# Patient Record
Sex: FEMALE | Race: WHITE | Employment: PART TIME | URBAN - NONMETROPOLITAN AREA
[De-identification: names, ages, dates, MRNs, and addresses within clinical notes are randomized per-mention and may not be internally consistent; named-entity substitution may affect disease eponyms.]

---

## 2021-06-17 ENCOUNTER — NURSE ONLY (OUTPATIENT)
Dept: LAB | Age: 26
End: 2021-06-17

## 2021-06-17 LAB
ABO: NORMAL
ALT SERPL-CCNC: 30 U/L (ref 11–66)
ANTIBODY SCREEN: NORMAL
AST SERPL-CCNC: 34 U/L (ref 5–40)
BASOPHILS # BLD: 0.2 %
BASOPHILS ABSOLUTE: 0 THOU/MM3 (ref 0–0.1)
BUN BLDV-MCNC: 7 MG/DL (ref 7–22)
CREAT SERPL-MCNC: 0.4 MG/DL (ref 0.4–1.2)
CREATININE URINE: 181.9 MG/DL
EOSINOPHIL # BLD: 0.6 %
EOSINOPHILS ABSOLUTE: 0.1 THOU/MM3 (ref 0–0.4)
ERYTHROCYTE [DISTWIDTH] IN BLOOD BY AUTOMATED COUNT: 13.2 % (ref 11.5–14.5)
ERYTHROCYTE [DISTWIDTH] IN BLOOD BY AUTOMATED COUNT: 39.8 FL (ref 35–45)
GFR SERPL CREATININE-BSD FRML MDRD: > 90 ML/MIN/1.73M2
HCT VFR BLD CALC: 43.5 % (ref 37–47)
HEMOGLOBIN: 13.4 GM/DL (ref 12–16)
HEPATITIS B SURFACE ANTIGEN: NEGATIVE
IMMATURE GRANS (ABS): 0.03 THOU/MM3 (ref 0–0.07)
IMMATURE GRANULOCYTES: 0.3 %
LYMPHOCYTES # BLD: 25 %
LYMPHOCYTES ABSOLUTE: 2.6 THOU/MM3 (ref 1–4.8)
MCH RBC QN AUTO: 25.8 PG (ref 26–33)
MCHC RBC AUTO-ENTMCNC: 30.8 GM/DL (ref 32.2–35.5)
MCV RBC AUTO: 83.8 FL (ref 81–99)
MONOCYTES # BLD: 4.5 %
MONOCYTES ABSOLUTE: 0.5 THOU/MM3 (ref 0.4–1.3)
NUCLEATED RED BLOOD CELLS: 0 /100 WBC
PLATELET # BLD: 247 THOU/MM3 (ref 130–400)
PMV BLD AUTO: 9.8 FL (ref 9.4–12.4)
PROT/CREAT RATIO, UR: 0.12
PROTEIN, URINE: 22.7 MG/DL
RBC # BLD: 5.19 MILL/MM3 (ref 4.2–5.4)
RH FACTOR: NORMAL
SEG NEUTROPHILS: 69.4 %
SEGMENTED NEUTROPHILS ABSOLUTE COUNT: 7.1 THOU/MM3 (ref 1.8–7.7)
URIC ACID: 3.5 MG/DL (ref 2.4–5.7)
WBC # BLD: 10.2 THOU/MM3 (ref 4.8–10.8)

## 2021-06-18 LAB
HIV AG/AB: NONREACTIVE
RPR: NONREACTIVE
RUBELLA: 26 IU/ML

## 2021-06-19 LAB
ORGANISM: ABNORMAL
URINE CULTURE, ROUTINE: ABNORMAL

## 2021-06-28 ENCOUNTER — NURSE ONLY (OUTPATIENT)
Dept: LAB | Age: 26
End: 2021-06-28

## 2021-06-28 LAB — HEPATITIS C ANTIBODY: NEGATIVE

## 2021-07-01 ENCOUNTER — HOSPITAL ENCOUNTER (OUTPATIENT)
Dept: ULTRASOUND IMAGING | Age: 26
Discharge: HOME | End: 2021-07-01
Payer: COMMERCIAL

## 2021-07-01 ENCOUNTER — HOSPITAL ENCOUNTER (OUTPATIENT)
Age: 26
Discharge: HOME | End: 2021-07-01
Payer: COMMERCIAL

## 2021-07-01 DIAGNOSIS — M32.9 SYSTEMIC LUPUS ERYTHEMATOSUS, UNSPECIFIED SLE TYPE, UNSPECIFIED ORGAN INVOLVEMENT STATUS (HCC): Primary | ICD-10-CM

## 2021-07-01 LAB
C3 COMPLEMENT: 166 MG/DL (ref 90–180)
COMPLEMENT C4: 28 MG/DL (ref 10–40)

## 2021-07-01 PROCEDURE — 86235 NUCLEAR ANTIGEN ANTIBODY: CPT

## 2021-07-01 PROCEDURE — 36415 COLL VENOUS BLD VENIPUNCTURE: CPT

## 2021-07-01 PROCEDURE — 85610 PROTHROMBIN TIME: CPT

## 2021-07-01 PROCEDURE — 86162 COMPLEMENT TOTAL (CH50): CPT

## 2021-07-01 PROCEDURE — 86225 DNA ANTIBODY NATIVE: CPT

## 2021-07-01 PROCEDURE — 85730 THROMBOPLASTIN TIME PARTIAL: CPT

## 2021-07-01 PROCEDURE — 99211 OFF/OP EST MAY X REQ PHY/QHP: CPT

## 2021-07-01 PROCEDURE — 99243 OFF/OP CNSLTJ NEW/EST LOW 30: CPT | Performed by: OBSTETRICS & GYNECOLOGY

## 2021-07-01 PROCEDURE — 85613 RUSSELL VIPER VENOM DILUTED: CPT

## 2021-07-01 PROCEDURE — 86038 ANTINUCLEAR ANTIBODIES: CPT

## 2021-07-01 PROCEDURE — 86160 COMPLEMENT ANTIGEN: CPT

## 2021-07-01 NOTE — LETTER
2021    Kinjal Cantu MD  [facility] Digna Chappell    RE:  Danielle Asencio  :  [birth year]    Dear Dr. Barbara Germain,    At your request, your patient, Danielle Asencio was seen today in consult given a prior diagnosis of systemic lupus erythematosis (SLE). I reviewed her medical history, discussed SLE and pregnancy, and we made an initial plan of care. The majority of time, greater than 50%, was spent in counseling and care coordination. The approximate time spent face to face was 20 minutes. She is a 22 yo  at [address] gestation (EDC:  22). She has had a reassuring first trimester screen. She was initially diagnosed with celiac disease and malabsorption disorder in [id number]. The patient disagrees with this diagnosis and thinks it was an early manifestation of SLE which she was diagnosed with about a year later in . She had initial symptoms of joint pain and fatigue. She was diagnosed by Dr. Myrna Sheth in [facility]. She took plaqenil for a year and then self-discontinued due to lack of symptoms. On closer questioning, every 1-2 months, she has episodes of increased hip stiffness, wrist pain, fatigue and a malar rash. She has no other medical problems, history of surgeries or allergies. She works as a nurses' aide. She had COVID in early  but feels well at this time. The baby's father, Roselyn Day, is also 22years of age. This will be his first child. His mother had surgery for VSD as a child but no other information is available for her family as she was adopted. The patient and I discussed her diagnostic parameters and she knows little about this. She wonders about the diagnosis and would like to be re-tested. She cannot get into a local rheumatologist until 2022. Her prior physician is currently in [place], per the patient. We made the following plan:    1. Level II survey in 4w  2.   Low dose aspirin for pre-eclampsia prevention  3. Appointment with [facility] rheumatology  4. The following labs were drawn today:  PAUL, LAC, Complements, SSA/SSB, anti-dsDNA, anti Red. Further plans pending those results. Thank you for allowing us to see this patient. Gideon Farrell MD, MPH

## 2021-07-01 NOTE — PLAN OF CARE
Provider discussed disease process, treatment plan, medications,and discharge instructions. Patient and significant other agrees with plan. Any questions were answered.

## 2021-07-01 NOTE — PROGRESS NOTES
HT:  5' 7\"  WT:  183. 6 Lbs.  83.5  Kg.  T:    97.9  Skin  BP: 116/68  P: 106  R: 20  Problems/concerns- \"Can't get into RA doctor until end of Jan\". No CoVid signs/symptoms.

## 2021-07-03 LAB
ANA SCREEN: NORMAL
COMPLEMENT TOTAL (CH50): 74 U/ML (ref 38.7–89.9)

## 2021-07-04 LAB — DSDNA ANTIBODY: NORMAL

## 2021-07-05 LAB
ANTI SSA: NORMAL
ANTI SSB: 0 AU/ML (ref 0–40)
ANTI-SMITH: 1 AU/ML (ref 0–40)
DRVVT 1:1 MIX: ABNORMAL SEC (ref 33–44)
DRVVT CONFIRMATION TEST: ABNORMAL RATIO
DRVVT SCREEN: 24 SEC (ref 33–44)
HEXAGONAL PHOSPHOLIPID NEUTRALIZAT TEST: ABNORMAL
LUPUS ANTICOAG INTERP: ABNORMAL
PLATELET NEUTRALIZATION: ABNORMAL
PROTHROMBIN TIME: 12.4 SEC (ref 12–15.5)
PTT 1:1 MIX: ABNORMAL SEC (ref 32–48)
PTT LUPUS ANTICOAGULANT: 34 SEC (ref 32–48)
PTT-HEPARIN NEUTRALIZED: ABNORMAL SEC (ref 32–48)
REPTILASE TIME: ABNORMAL SEC
THROMBIN TIME: ABNORMAL SEC (ref 14.7–19.5)

## 2021-08-05 ENCOUNTER — HOSPITAL ENCOUNTER (OUTPATIENT)
Dept: ULTRASOUND IMAGING | Age: 26
Discharge: HOME | End: 2021-08-05
Payer: COMMERCIAL

## 2021-08-05 DIAGNOSIS — M32.9 LUPUS (HCC): ICD-10-CM

## 2021-08-05 PROCEDURE — 76811 OB US DETAILED SNGL FETUS: CPT

## 2021-08-05 PROCEDURE — 99211 OFF/OP EST MAY X REQ PHY/QHP: CPT

## 2021-08-05 NOTE — PROGRESS NOTES
HT: 5' 7\"    WT:   187.2 Lbs.  85.1 Kg.  T:    98.1  Skin  BP: 117/58  P: 84  R: 20  Problems/concerns- \"Really tired at night feel like I am going to pass out. \"  I asked if anyone suggested put feet up at night and she stated \"no-but I have been reading that\"  I also suggested drinking plenty of fluids and she stated \"she has-drinking all the time\". No CoVid signs/symptoms.

## 2022-01-04 ENCOUNTER — HOSPITAL ENCOUNTER (INPATIENT)
Age: 27
LOS: 2 days | Discharge: HOME | End: 2022-01-07
Attending: OBSTETRICS & GYNECOLOGY | Admitting: OBSTETRICS & GYNECOLOGY
Payer: COMMERCIAL

## 2022-01-04 ENCOUNTER — APPOINTMENT (OUTPATIENT)
Dept: LABOR AND DELIVERY | Age: 27
End: 2022-01-04
Payer: COMMERCIAL

## 2022-01-04 LAB
ABO: NORMAL
AMPHETAMINE+METHAMPHETAMINE URINE SCREEN: NEGATIVE
ANTIBODY SCREEN: NORMAL
BARBITURATE QUANTITATIVE URINE: NEGATIVE
BASOPHILS # BLD: 0.1 %
BASOPHILS ABSOLUTE: 0 THOU/MM3 (ref 0–0.1)
BENZODIAZEPINE QUANTITATIVE URINE: NEGATIVE
CANNABINOID QUANTITATIVE URINE: NEGATIVE
COCAINE METABOLITE QUANTITATIVE URINE: NEGATIVE
EOSINOPHIL # BLD: 0.2 %
EOSINOPHILS ABSOLUTE: 0 THOU/MM3 (ref 0–0.4)
ERYTHROCYTE [DISTWIDTH] IN BLOOD BY AUTOMATED COUNT: 17.3 % (ref 11.5–14.5)
ERYTHROCYTE [DISTWIDTH] IN BLOOD BY AUTOMATED COUNT: 51.2 FL (ref 35–45)
HCT VFR BLD CALC: 40.3 % (ref 37–47)
HEMOGLOBIN: 13 GM/DL (ref 12–16)
IMMATURE GRANS (ABS): 0.08 THOU/MM3 (ref 0–0.07)
IMMATURE GRANULOCYTES: 0.9 %
LYMPHOCYTES # BLD: 21.5 %
LYMPHOCYTES ABSOLUTE: 1.9 THOU/MM3 (ref 1–4.8)
MCH RBC QN AUTO: 26.4 PG (ref 26–33)
MCHC RBC AUTO-ENTMCNC: 32.3 GM/DL (ref 32.2–35.5)
MCV RBC AUTO: 81.9 FL (ref 81–99)
MONOCYTES # BLD: 6.6 %
MONOCYTES ABSOLUTE: 0.6 THOU/MM3 (ref 0.4–1.3)
NUCLEATED RED BLOOD CELLS: 0 /100 WBC
OPIATES, URINE: NEGATIVE
OXYCODONE: NEGATIVE
PHENCYCLIDINE QUANTITATIVE URINE: NEGATIVE
PLATELET # BLD: 155 THOU/MM3 (ref 130–400)
PMV BLD AUTO: 10.8 FL (ref 9.4–12.4)
RBC # BLD: 4.92 MILL/MM3 (ref 4.2–5.4)
RH FACTOR: NORMAL
SEG NEUTROPHILS: 70.7 %
SEGMENTED NEUTROPHILS ABSOLUTE COUNT: 6.2 THOU/MM3 (ref 1.8–7.7)
WBC # BLD: 8.8 THOU/MM3 (ref 4.8–10.8)

## 2022-01-04 PROCEDURE — 86592 SYPHILIS TEST NON-TREP QUAL: CPT

## 2022-01-04 PROCEDURE — 85025 COMPLETE CBC W/AUTO DIFF WBC: CPT

## 2022-01-04 PROCEDURE — 3E033VJ INTRODUCTION OF OTHER HORMONE INTO PERIPHERAL VEIN, PERCUTANEOUS APPROACH: ICD-10-PCS | Performed by: OBSTETRICS & GYNECOLOGY

## 2022-01-04 PROCEDURE — 2580000003 HC RX 258: Performed by: OBSTETRICS & GYNECOLOGY

## 2022-01-04 PROCEDURE — 86900 BLOOD TYPING SEROLOGIC ABO: CPT

## 2022-01-04 PROCEDURE — 86850 RBC ANTIBODY SCREEN: CPT

## 2022-01-04 PROCEDURE — 80307 DRUG TEST PRSMV CHEM ANLYZR: CPT

## 2022-01-04 PROCEDURE — 86901 BLOOD TYPING SEROLOGIC RH(D): CPT

## 2022-01-04 RX ORDER — SODIUM CHLORIDE, SODIUM LACTATE, POTASSIUM CHLORIDE, CALCIUM CHLORIDE 600; 310; 30; 20 MG/100ML; MG/100ML; MG/100ML; MG/100ML
INJECTION, SOLUTION INTRAVENOUS CONTINUOUS
Status: DISCONTINUED | OUTPATIENT
Start: 2022-01-04 | End: 2022-01-04

## 2022-01-04 RX ORDER — SODIUM CHLORIDE, SODIUM LACTATE, POTASSIUM CHLORIDE, AND CALCIUM CHLORIDE .6; .31; .03; .02 G/100ML; G/100ML; G/100ML; G/100ML
1000 INJECTION, SOLUTION INTRAVENOUS PRN
Status: DISCONTINUED | OUTPATIENT
Start: 2022-01-04 | End: 2022-01-04

## 2022-01-04 RX ORDER — CARBOPROST TROMETHAMINE 250 UG/ML
250 INJECTION, SOLUTION INTRAMUSCULAR PRN
Status: DISCONTINUED | OUTPATIENT
Start: 2022-01-04 | End: 2022-01-05 | Stop reason: HOSPADM

## 2022-01-04 RX ORDER — DIPHENHYDRAMINE HYDROCHLORIDE 50 MG/ML
25 INJECTION INTRAMUSCULAR; INTRAVENOUS EVERY 4 HOURS PRN
Status: DISCONTINUED | OUTPATIENT
Start: 2022-01-04 | End: 2022-01-04

## 2022-01-04 RX ORDER — ONDANSETRON 2 MG/ML
8 INJECTION INTRAMUSCULAR; INTRAVENOUS EVERY 6 HOURS PRN
Status: DISCONTINUED | OUTPATIENT
Start: 2022-01-04 | End: 2022-01-05 | Stop reason: HOSPADM

## 2022-01-04 RX ORDER — TERBUTALINE SULFATE 1 MG/ML
0.25 INJECTION, SOLUTION SUBCUTANEOUS
Status: ACTIVE | OUTPATIENT
Start: 2022-01-04 | End: 2022-01-04

## 2022-01-04 RX ORDER — SODIUM CHLORIDE, SODIUM LACTATE, POTASSIUM CHLORIDE, CALCIUM CHLORIDE 600; 310; 30; 20 MG/100ML; MG/100ML; MG/100ML; MG/100ML
INJECTION, SOLUTION INTRAVENOUS CONTINUOUS
Status: DISCONTINUED | OUTPATIENT
Start: 2022-01-04 | End: 2022-01-05

## 2022-01-04 RX ORDER — SODIUM CHLORIDE 0.9 % (FLUSH) 0.9 %
5-40 SYRINGE (ML) INJECTION EVERY 12 HOURS SCHEDULED
Status: DISCONTINUED | OUTPATIENT
Start: 2022-01-04 | End: 2022-01-05 | Stop reason: HOSPADM

## 2022-01-04 RX ORDER — SODIUM CHLORIDE, SODIUM LACTATE, POTASSIUM CHLORIDE, AND CALCIUM CHLORIDE .6; .31; .03; .02 G/100ML; G/100ML; G/100ML; G/100ML
500 INJECTION, SOLUTION INTRAVENOUS PRN
Status: DISCONTINUED | OUTPATIENT
Start: 2022-01-04 | End: 2022-01-04

## 2022-01-04 RX ORDER — ASPIRIN 81 MG/1
81 TABLET, CHEWABLE ORAL DAILY
Status: ON HOLD | COMMUNITY
End: 2022-01-07 | Stop reason: HOSPADM

## 2022-01-04 RX ORDER — DOCUSATE SODIUM 100 MG/1
100 CAPSULE, LIQUID FILLED ORAL 2 TIMES DAILY
Status: DISCONTINUED | OUTPATIENT
Start: 2022-01-04 | End: 2022-01-04

## 2022-01-04 RX ORDER — MORPHINE SULFATE 4 MG/ML
4 INJECTION, SOLUTION INTRAMUSCULAR; INTRAVENOUS
Status: DISCONTINUED | OUTPATIENT
Start: 2022-01-04 | End: 2022-01-05 | Stop reason: HOSPADM

## 2022-01-04 RX ORDER — MISOPROSTOL 200 UG/1
1000 TABLET ORAL PRN
Status: DISCONTINUED | OUTPATIENT
Start: 2022-01-04 | End: 2022-01-05 | Stop reason: HOSPADM

## 2022-01-04 RX ORDER — SODIUM CHLORIDE, SODIUM LACTATE, POTASSIUM CHLORIDE, AND CALCIUM CHLORIDE .6; .31; .03; .02 G/100ML; G/100ML; G/100ML; G/100ML
1000 INJECTION, SOLUTION INTRAVENOUS PRN
Status: DISCONTINUED | OUTPATIENT
Start: 2022-01-04 | End: 2022-01-05 | Stop reason: HOSPADM

## 2022-01-04 RX ORDER — SODIUM CHLORIDE 0.9 % (FLUSH) 0.9 %
5-40 SYRINGE (ML) INJECTION EVERY 12 HOURS SCHEDULED
Status: DISCONTINUED | OUTPATIENT
Start: 2022-01-04 | End: 2022-01-04

## 2022-01-04 RX ORDER — SEVOFLURANE 250 ML/250ML
1 LIQUID RESPIRATORY (INHALATION) CONTINUOUS PRN
Status: DISCONTINUED | OUTPATIENT
Start: 2022-01-04 | End: 2022-01-05 | Stop reason: HOSPADM

## 2022-01-04 RX ORDER — IBUPROFEN 800 MG/1
800 TABLET ORAL EVERY 8 HOURS PRN
Status: DISCONTINUED | OUTPATIENT
Start: 2022-01-04 | End: 2022-01-05 | Stop reason: HOSPADM

## 2022-01-04 RX ORDER — ONDANSETRON 2 MG/ML
4 INJECTION INTRAMUSCULAR; INTRAVENOUS EVERY 6 HOURS PRN
Status: DISCONTINUED | OUTPATIENT
Start: 2022-01-04 | End: 2022-01-04

## 2022-01-04 RX ORDER — MORPHINE SULFATE 2 MG/ML
2 INJECTION, SOLUTION INTRAMUSCULAR; INTRAVENOUS
Status: DISCONTINUED | OUTPATIENT
Start: 2022-01-04 | End: 2022-01-05 | Stop reason: HOSPADM

## 2022-01-04 RX ORDER — DIPHENHYDRAMINE HYDROCHLORIDE 50 MG/ML
25 INJECTION INTRAMUSCULAR; INTRAVENOUS EVERY 4 HOURS PRN
Status: DISCONTINUED | OUTPATIENT
Start: 2022-01-04 | End: 2022-01-05 | Stop reason: HOSPADM

## 2022-01-04 RX ORDER — LIDOCAINE HYDROCHLORIDE 10 MG/ML
30 INJECTION, SOLUTION EPIDURAL; INFILTRATION; INTRACAUDAL; PERINEURAL PRN
Status: DISCONTINUED | OUTPATIENT
Start: 2022-01-04 | End: 2022-01-05 | Stop reason: HOSPADM

## 2022-01-04 RX ORDER — SODIUM CHLORIDE 9 MG/ML
25 INJECTION, SOLUTION INTRAVENOUS PRN
Status: DISCONTINUED | OUTPATIENT
Start: 2022-01-04 | End: 2022-01-05 | Stop reason: HOSPADM

## 2022-01-04 RX ORDER — SODIUM CHLORIDE 0.9 % (FLUSH) 0.9 %
5-40 SYRINGE (ML) INJECTION PRN
Status: DISCONTINUED | OUTPATIENT
Start: 2022-01-04 | End: 2022-01-04

## 2022-01-04 RX ORDER — METHYLERGONOVINE MALEATE 0.2 MG/ML
200 INJECTION INTRAVENOUS PRN
Status: DISCONTINUED | OUTPATIENT
Start: 2022-01-04 | End: 2022-01-05 | Stop reason: HOSPADM

## 2022-01-04 RX ORDER — BUTORPHANOL TARTRATE 1 MG/ML
1 INJECTION, SOLUTION INTRAMUSCULAR; INTRAVENOUS
Status: DISCONTINUED | OUTPATIENT
Start: 2022-01-04 | End: 2022-01-05 | Stop reason: HOSPADM

## 2022-01-04 RX ORDER — CARBOPROST TROMETHAMINE 250 UG/ML
250 INJECTION, SOLUTION INTRAMUSCULAR PRN
Status: DISCONTINUED | OUTPATIENT
Start: 2022-01-04 | End: 2022-01-04

## 2022-01-04 RX ORDER — ACETAMINOPHEN 325 MG/1
650 TABLET ORAL EVERY 4 HOURS PRN
Status: DISCONTINUED | OUTPATIENT
Start: 2022-01-04 | End: 2022-01-04

## 2022-01-04 RX ORDER — SODIUM CHLORIDE 9 MG/ML
25 INJECTION, SOLUTION INTRAVENOUS PRN
Status: DISCONTINUED | OUTPATIENT
Start: 2022-01-04 | End: 2022-01-04

## 2022-01-04 RX ORDER — SODIUM CHLORIDE, SODIUM LACTATE, POTASSIUM CHLORIDE, AND CALCIUM CHLORIDE .6; .31; .03; .02 G/100ML; G/100ML; G/100ML; G/100ML
500 INJECTION, SOLUTION INTRAVENOUS PRN
Status: DISCONTINUED | OUTPATIENT
Start: 2022-01-04 | End: 2022-01-05 | Stop reason: HOSPADM

## 2022-01-04 RX ORDER — SODIUM CHLORIDE 0.9 % (FLUSH) 0.9 %
5-40 SYRINGE (ML) INJECTION PRN
Status: DISCONTINUED | OUTPATIENT
Start: 2022-01-04 | End: 2022-01-05 | Stop reason: HOSPADM

## 2022-01-04 RX ORDER — METHYLERGONOVINE MALEATE 0.2 MG/ML
200 INJECTION INTRAVENOUS PRN
Status: DISCONTINUED | OUTPATIENT
Start: 2022-01-04 | End: 2022-01-04

## 2022-01-04 RX ADMIN — SODIUM CHLORIDE, POTASSIUM CHLORIDE, SODIUM LACTATE AND CALCIUM CHLORIDE: 600; 310; 30; 20 INJECTION, SOLUTION INTRAVENOUS at 17:00

## 2022-01-04 RX ADMIN — SODIUM CHLORIDE, POTASSIUM CHLORIDE, SODIUM LACTATE AND CALCIUM CHLORIDE: 600; 310; 30; 20 INJECTION, SOLUTION INTRAVENOUS at 18:00

## 2022-01-04 RX ADMIN — SODIUM CHLORIDE, POTASSIUM CHLORIDE, SODIUM LACTATE AND CALCIUM CHLORIDE: 600; 310; 30; 20 INJECTION, SOLUTION INTRAVENOUS at 22:08

## 2022-01-04 RX ADMIN — Medication 1 MILLI-UNITS/MIN: at 22:30

## 2022-01-05 ENCOUNTER — ANESTHESIA EVENT (OUTPATIENT)
Dept: LABOR AND DELIVERY | Age: 27
End: 2022-01-05
Payer: COMMERCIAL

## 2022-01-05 ENCOUNTER — ANESTHESIA (OUTPATIENT)
Dept: LABOR AND DELIVERY | Age: 27
End: 2022-01-05
Payer: COMMERCIAL

## 2022-01-05 LAB — RPR: NONREACTIVE

## 2022-01-05 PROCEDURE — 7200000001 HC VAGINAL DELIVERY

## 2022-01-05 PROCEDURE — 2500000003 HC RX 250 WO HCPCS

## 2022-01-05 PROCEDURE — 6360000002 HC RX W HCPCS

## 2022-01-05 PROCEDURE — 2500000003 HC RX 250 WO HCPCS: Performed by: OBSTETRICS & GYNECOLOGY

## 2022-01-05 PROCEDURE — 0KQM0ZZ REPAIR PERINEUM MUSCLE, OPEN APPROACH: ICD-10-PCS | Performed by: OBSTETRICS & GYNECOLOGY

## 2022-01-05 PROCEDURE — 1220000000 HC SEMI PRIVATE OB R&B

## 2022-01-05 PROCEDURE — 2500000003 HC RX 250 WO HCPCS: Performed by: NURSE ANESTHETIST, CERTIFIED REGISTERED

## 2022-01-05 PROCEDURE — 6370000000 HC RX 637 (ALT 250 FOR IP): Performed by: OBSTETRICS & GYNECOLOGY

## 2022-01-05 PROCEDURE — 2580000003 HC RX 258: Performed by: OBSTETRICS & GYNECOLOGY

## 2022-01-05 PROCEDURE — 3700000025 EPIDURAL BLOCK: Performed by: ANESTHESIOLOGY

## 2022-01-05 PROCEDURE — 2580000003 HC RX 258

## 2022-01-05 PROCEDURE — 6360000002 HC RX W HCPCS: Performed by: NURSE ANESTHETIST, CERTIFIED REGISTERED

## 2022-01-05 PROCEDURE — 6360000002 HC RX W HCPCS: Performed by: OBSTETRICS & GYNECOLOGY

## 2022-01-05 RX ORDER — SODIUM CHLORIDE, SODIUM LACTATE, POTASSIUM CHLORIDE, CALCIUM CHLORIDE 600; 310; 30; 20 MG/100ML; MG/100ML; MG/100ML; MG/100ML
INJECTION, SOLUTION INTRAVENOUS CONTINUOUS
Status: DISCONTINUED | OUTPATIENT
Start: 2022-01-05 | End: 2022-01-07 | Stop reason: HOSPADM

## 2022-01-05 RX ORDER — ONDANSETRON 2 MG/ML
4 INJECTION INTRAMUSCULAR; INTRAVENOUS EVERY 6 HOURS PRN
Status: DISCONTINUED | OUTPATIENT
Start: 2022-01-05 | End: 2022-01-05 | Stop reason: HOSPADM

## 2022-01-05 RX ORDER — CARBOPROST TROMETHAMINE 250 UG/ML
250 INJECTION, SOLUTION INTRAMUSCULAR PRN
Status: DISCONTINUED | OUTPATIENT
Start: 2022-01-05 | End: 2022-01-07 | Stop reason: HOSPADM

## 2022-01-05 RX ORDER — ROPIVACAINE HYDROCHLORIDE 2 MG/ML
INJECTION, SOLUTION EPIDURAL; INFILTRATION; PERINEURAL
Status: COMPLETED
Start: 2022-01-05 | End: 2022-01-05

## 2022-01-05 RX ORDER — MISOPROSTOL 200 UG/1
800 TABLET ORAL PRN
Status: DISCONTINUED | OUTPATIENT
Start: 2022-01-05 | End: 2022-01-07 | Stop reason: HOSPADM

## 2022-01-05 RX ORDER — SODIUM CHLORIDE 0.9 % (FLUSH) 0.9 %
10 SYRINGE (ML) INJECTION PRN
Status: DISCONTINUED | OUTPATIENT
Start: 2022-01-05 | End: 2022-01-07 | Stop reason: HOSPADM

## 2022-01-05 RX ORDER — EPHEDRINE SULFATE/0.9% NACL/PF 50 MG/5 ML
SYRINGE (ML) INTRAVENOUS
Status: COMPLETED
Start: 2022-01-05 | End: 2022-01-05

## 2022-01-05 RX ORDER — FENTANYL CITRATE 50 UG/ML
INJECTION, SOLUTION INTRAMUSCULAR; INTRAVENOUS
Status: COMPLETED
Start: 2022-01-05 | End: 2022-01-05

## 2022-01-05 RX ORDER — NALOXONE HYDROCHLORIDE 0.4 MG/ML
0.4 INJECTION, SOLUTION INTRAMUSCULAR; INTRAVENOUS; SUBCUTANEOUS PRN
Status: DISCONTINUED | OUTPATIENT
Start: 2022-01-05 | End: 2022-01-05 | Stop reason: HOSPADM

## 2022-01-05 RX ORDER — FERROUS SULFATE 325(65) MG
325 TABLET ORAL
Status: DISCONTINUED | OUTPATIENT
Start: 2022-01-06 | End: 2022-01-07 | Stop reason: HOSPADM

## 2022-01-05 RX ORDER — PROMETHAZINE HYDROCHLORIDE 25 MG/ML
25 INJECTION, SOLUTION INTRAMUSCULAR; INTRAVENOUS EVERY 6 HOURS PRN
Status: DISCONTINUED | OUTPATIENT
Start: 2022-01-05 | End: 2022-01-05

## 2022-01-05 RX ORDER — METHYLERGONOVINE MALEATE 0.2 MG/ML
200 INJECTION INTRAVENOUS PRN
Status: DISCONTINUED | OUTPATIENT
Start: 2022-01-05 | End: 2022-01-07 | Stop reason: HOSPADM

## 2022-01-05 RX ORDER — EPHEDRINE SULFATE/0.9% NACL/PF 50 MG/5 ML
10 SYRINGE (ML) INTRAVENOUS ONCE
Status: COMPLETED | OUTPATIENT
Start: 2022-01-05 | End: 2022-01-05

## 2022-01-05 RX ORDER — ONDANSETRON 4 MG/1
8 TABLET, ORALLY DISINTEGRATING ORAL EVERY 8 HOURS PRN
Status: DISCONTINUED | OUTPATIENT
Start: 2022-01-05 | End: 2022-01-07 | Stop reason: HOSPADM

## 2022-01-05 RX ORDER — IBUPROFEN 800 MG/1
800 TABLET ORAL 3 TIMES DAILY
Status: DISCONTINUED | OUTPATIENT
Start: 2022-01-05 | End: 2022-01-07 | Stop reason: HOSPADM

## 2022-01-05 RX ORDER — ACETAMINOPHEN 325 MG/1
650 TABLET ORAL EVERY 4 HOURS PRN
Status: DISCONTINUED | OUTPATIENT
Start: 2022-01-05 | End: 2022-01-07 | Stop reason: HOSPADM

## 2022-01-05 RX ORDER — ACETAMINOPHEN 500 MG
1000 TABLET ORAL ONCE
Status: COMPLETED | OUTPATIENT
Start: 2022-01-05 | End: 2022-01-05

## 2022-01-05 RX ORDER — ROPIVACAINE HYDROCHLORIDE 2 MG/ML
INJECTION, SOLUTION EPIDURAL; INFILTRATION; PERINEURAL PRN
Status: DISCONTINUED | OUTPATIENT
Start: 2022-01-05 | End: 2022-01-06 | Stop reason: SDUPTHER

## 2022-01-05 RX ORDER — PROMETHAZINE HYDROCHLORIDE 25 MG/ML
INJECTION, SOLUTION INTRAMUSCULAR; INTRAVENOUS
Status: COMPLETED
Start: 2022-01-05 | End: 2022-01-05

## 2022-01-05 RX ORDER — DOCUSATE SODIUM 100 MG/1
100 CAPSULE, LIQUID FILLED ORAL 2 TIMES DAILY PRN
Status: DISCONTINUED | OUTPATIENT
Start: 2022-01-05 | End: 2022-01-07 | Stop reason: HOSPADM

## 2022-01-05 RX ORDER — ONDANSETRON 2 MG/ML
4 INJECTION INTRAMUSCULAR; INTRAVENOUS EVERY 6 HOURS PRN
Status: DISCONTINUED | OUTPATIENT
Start: 2022-01-05 | End: 2022-01-07 | Stop reason: HOSPADM

## 2022-01-05 RX ORDER — FENTANYL CITRATE 50 UG/ML
INJECTION, SOLUTION INTRAMUSCULAR; INTRAVENOUS PRN
Status: DISCONTINUED | OUTPATIENT
Start: 2022-01-05 | End: 2022-01-05

## 2022-01-05 RX ORDER — SODIUM CHLORIDE 9 MG/ML
25 INJECTION, SOLUTION INTRAVENOUS PRN
Status: DISCONTINUED | OUTPATIENT
Start: 2022-01-05 | End: 2022-01-07 | Stop reason: HOSPADM

## 2022-01-05 RX ORDER — MODIFIED LANOLIN
OINTMENT (GRAM) TOPICAL PRN
Status: DISCONTINUED | OUTPATIENT
Start: 2022-01-05 | End: 2022-01-07 | Stop reason: HOSPADM

## 2022-01-05 RX ORDER — FENTANYL CITRATE 50 UG/ML
INJECTION, SOLUTION INTRAMUSCULAR; INTRAVENOUS PRN
Status: DISCONTINUED | OUTPATIENT
Start: 2022-01-05 | End: 2022-01-06 | Stop reason: SDUPTHER

## 2022-01-05 RX ORDER — SODIUM CHLORIDE 0.9 % (FLUSH) 0.9 %
10 SYRINGE (ML) INJECTION EVERY 12 HOURS SCHEDULED
Status: DISCONTINUED | OUTPATIENT
Start: 2022-01-05 | End: 2022-01-07 | Stop reason: HOSPADM

## 2022-01-05 RX ADMIN — BUTORPHANOL TARTRATE 1 MG: 1 INJECTION, SOLUTION INTRAMUSCULAR; INTRAVENOUS at 00:23

## 2022-01-05 RX ADMIN — Medication 10 MG: at 04:19

## 2022-01-05 RX ADMIN — PROMETHAZINE HYDROCHLORIDE 25 MG: 25 INJECTION INTRAMUSCULAR; INTRAVENOUS at 11:14

## 2022-01-05 RX ADMIN — DOCUSATE SODIUM 100 MG: 100 CAPSULE ORAL at 21:22

## 2022-01-05 RX ADMIN — ACETAMINOPHEN 1000 MG: 500 TABLET ORAL at 14:09

## 2022-01-05 RX ADMIN — SODIUM CHLORIDE, POTASSIUM CHLORIDE, SODIUM LACTATE AND CALCIUM CHLORIDE: 600; 310; 30; 20 INJECTION, SOLUTION INTRAVENOUS at 04:38

## 2022-01-05 RX ADMIN — ROPIVACAINE HYDROCHLORIDE 6 ML: 2 INJECTION, SOLUTION EPIDURAL; INFILTRATION at 12:48

## 2022-01-05 RX ADMIN — Medication 15 ML/HR: at 04:07

## 2022-01-05 RX ADMIN — ONDANSETRON 8 MG: 2 INJECTION INTRAMUSCULAR; INTRAVENOUS at 07:47

## 2022-01-05 RX ADMIN — IBUPROFEN 800 MG: 800 TABLET, FILM COATED ORAL at 16:28

## 2022-01-05 RX ADMIN — FENTANYL CITRATE 100 MCG: 50 INJECTION, SOLUTION INTRAMUSCULAR; INTRAVENOUS at 12:48

## 2022-01-05 RX ADMIN — Medication 10 MG: at 04:16

## 2022-01-05 RX ADMIN — Medication 166.7 ML: at 14:09

## 2022-01-05 RX ADMIN — ROPIVACAINE HYDROCHLORIDE 5 ML: 2 INJECTION, SOLUTION EPIDURAL; INFILTRATION at 04:07

## 2022-01-05 RX ADMIN — ONDANSETRON 8 MG: 2 INJECTION INTRAMUSCULAR; INTRAVENOUS at 00:38

## 2022-01-05 RX ADMIN — SODIUM CHLORIDE, POTASSIUM CHLORIDE, SODIUM LACTATE AND CALCIUM CHLORIDE: 600; 310; 30; 20 INJECTION, SOLUTION INTRAVENOUS at 12:00

## 2022-01-05 RX ADMIN — FENTANYL CITRATE 100 MCG: 50 INJECTION, SOLUTION INTRAMUSCULAR; INTRAVENOUS at 04:07

## 2022-01-05 RX ADMIN — PROMETHAZINE HYDROCHLORIDE 25 MG: 25 INJECTION, SOLUTION INTRAMUSCULAR; INTRAVENOUS at 11:14

## 2022-01-05 ASSESSMENT — PAIN SCALES - GENERAL
PAINLEVEL_OUTOF10: 0
PAINLEVEL_OUTOF10: 4
PAINLEVEL_OUTOF10: 7

## 2022-01-05 ASSESSMENT — PAIN DESCRIPTION - DESCRIPTORS: DESCRIPTORS: CRAMPING

## 2022-01-05 NOTE — ANESTHESIA PROCEDURE NOTES
Epidural Block    Patient location during procedure: OB  Start time: 1/5/2022 3:55 AM  End time: 1/5/2022 4:07 AM  Reason for block: labor epidural  Staffing  Anesthesiologist: Jose Simmons MD  Resident/CRNA: REJI Blair - CRNA  Preanesthetic Checklist  Completed: patient identified, IV checked, site marked, risks and benefits discussed, surgical consent, monitors and equipment checked, pre-op evaluation, timeout performed, anesthesia consent given, oxygen available and patient being monitored  Epidural  Patient position: sitting  Prep: ChloraPrep  Patient monitoring: cardiac monitor, continuous pulse ox and frequent blood pressure checks  Approach: midline  Location: lumbar (1-5)  Injection technique: STACEY air  Provider prep: mask and sterile gloves  Needle  Needle type: Tuohy   Needle gauge: 18 G  Needle length: 3.5 in  Needle insertion depth: 8 cm  Catheter type: stylet  Catheter size: 20.  Catheter at skin depth: 14 cm  Test dose: negative  Assessment  Sensory level: T6  Hemodynamics: stable  Attempts: 1  Additional Notes  Single shot sitting epidural placed without issue

## 2022-01-05 NOTE — PROGRESS NOTES
S: comfortable with epidural. C/o nausea and vomiting. Getting zofran now. O:  Vitals:    01/05/22 0749   BP:    Pulse:    Resp: 20   Temp:    SpO2:      Sve: 4-5/70/-3, AROM for clear fluid    FHTs: 150, mod shelley, no accels, no decels  [place]: q3-4 min. Category I tracing. A: 24 yo G1 @ 39+6 wks with IOL, h/o Lupus but unconfirmed by recent testing  P:  1. con't to work towards delivery. 2. GBS neg.      Garret Patel MD  8:00 AM  1/5/2022

## 2022-01-05 NOTE — PLAN OF CARE
Problem: Pain:  Goal: Pain level will decrease  Description: Pain level will decrease  1/4/2022 2138 by Maxim Benavides RN  Note: Educated on pain relief options, undecided what she wants to do yet      Problem: Anxiety:  Goal: Level of anxiety will decrease  Description: Level of anxiety will decrease  1/4/2022 2138 by Maxim Benavides RN  Outcome: Ongoing  Note: Pt remains calm about the birthing experience, FOB at bedside, supportive. All questions/concerns addressed by RN. Problem: Breathing Pattern - Ineffective:  Goal: Able to breathe comfortably  Description: Able to breathe comfortably  1/4/2022 2138 by Maxim Benavides RN  Outcome: Ongoing  Note: No signs of resp distress noted. Sp02 remains greater than 92% on room air. Respirations equal and unlabored. Problem: Fluid Volume - Imbalance:  Goal: Absence of imbalanced fluid volume signs and symptoms  Description: Absence of imbalanced fluid volume signs and symptoms  1/4/2022 2138 by Maxim Benavides RN  Outcome: Ongoing  Note: IV fluids infusing, encouraging oral hydration      Problem: Fluid Volume - Imbalance:  Goal: Absence of intrapartum hemorrhage signs and symptoms  Description: Absence of intrapartum hemorrhage signs and symptoms  1/4/2022 2138 by Maxim Benavides RN  Outcome: Ongoing  Note: No signs of active bleeding. Will continue to monitor      Problem: Infection - Intrapartum Infection:  Goal: Will show no infection signs and symptoms  Description: Will show no infection signs and symptoms  1/4/2022 2138 by Maxim Benavides RN  Outcome: Ongoing  Note: Vitals stable, pt remains afebrile. FHT's remain reassuring, will continue to monitor. Problem: Labor Process - Prolonged:  Goal: Uterine contractions within specified parameters  Description: Uterine contractions within specified parameters  1/4/2022 2138 by Maxim Benavides RN  Outcome: Ongoing  Note: [place] applied to soft, non-tender abdomen.  Will continue to monitor Problem:  Screening:  Goal: Ability to make informed decisions regarding treatment has improved  Description: Ability to make informed decisions regarding treatment has improved  2022 by Brenda Smith RN  Outcome: Ongoing  Note: Pt able to make own decisions      Problem: Pain - Acute:  Goal: Pain level will decrease  Description: Pain level will decrease  2022 by Brenda Smith RN  Note: Educated on pain relief options, undecided what she wants to do yet      Problem: Tissue Perfusion - Uteroplacental, Altered:  Goal: Absence of abnormal fetal heart rate pattern  Description: Absence of abnormal fetal heart rate pattern  2022 by Brenda Smith RN  Outcome: Ongoing  Note: FHT reactive. US applied. Will continue to monitor      Problem: Urinary Retention:  Goal: Urinary elimination within specified parameters  Description: Urinary elimination within specified parameters  2022 by Brenda Smith RN  Outcome: Ongoing  Note: Voiding freely with relief. Care plan reviewed with patient and FOB. Patient and FOB verbalize understanding of the plan of care and contribute to goal setting.

## 2022-01-05 NOTE — PLAN OF CARE
Last Office Visit: 10/31/18  Future [facility] Appointments: None scheduled  Medication last refilled: 11/26/18 #90 with 0 refills - 30 day supply.     To provider to authorize  Routing refill request to provider for review/approval because:  Drug not on the [facility] refill protocol     This is new med started on 10/31/18 and pt refilling every 30 days. OK to fill for 90 days? With refills?    Karen Sosa RN     Problem: Pain:  Goal: Pain level will decrease  Description: Pain level will decrease  Outcome: Ongoing  Note: Pt is undecided regarding what she will be using for pain control   Goal: Control of acute pain  Description: Control of acute pain  Outcome: Ongoing  Goal: Control of chronic pain  Description: Control of chronic pain  Outcome: Ongoing     Problem: Anxiety:  Goal: Level of anxiety will decrease  Description: Level of anxiety will decrease  Outcome: Ongoing  Note: Pt level of anxiety will remain low with support from family and staff     Problem: Breathing Pattern - Ineffective:  Goal: Able to breathe comfortably  Description: Able to breathe comfortably  Outcome: Ongoing  Note: Respirs will remain easy and unlabored     Problem: Fluid Volume - Imbalance:  Goal: Absence of imbalanced fluid volume signs and symptoms  Description: Absence of imbalanced fluid volume signs and symptoms  Outcome: Ongoing  Goal: Absence of intrapartum hemorrhage signs and symptoms  Description: Absence of intrapartum hemorrhage signs and symptoms  Outcome: Ongoing  Note: Continue to observe for any unusual bleeding before or aftewr delivery     Problem: Infection - Intrapartum Infection:  Goal: Will show no infection signs and symptoms  Description: Will show no infection signs and symptoms  Outcome: Ongoing  Note: Pt will continue to afebrile     Problem: Labor Process - Prolonged:  Goal: Labor progression, first stage, within specified pattern  Description: Labor progression, first stage, within specified pattern  Outcome: Ongoing  Goal: Labor progession, second stage, within specified pattern  Description: Labor progession, second stage, within specified pattern  Outcome: Ongoing  Goal: Uterine contractions within specified parameters  Description: Uterine contractions within specified parameters  Outcome: Ongoing  Note: With admin of pitocin, ctx will become better quality and quantity     Problem:  Screening:  Goal: Ability to make informed decisions regarding treatment has improved  Description: Ability to make informed decisions regarding treatment has improved  Outcome: Ongoing  Note: Pt will continue to make informed decisions regarding tx     Problem: Pain - Acute:  Goal: Pain level will decrease  Description: Pain level will decrease  Outcome: Ongoing  Note: Pt is undecided regarding what she will be using for pain control   Goal: Able to cope with pain  Description: Able to cope with pain  Outcome: Ongoing  Note: Pt undecided as to what form of medication she will be using for pain control     Problem: Tissue Perfusion - Uteroplacental, Altered:  Goal: Absence of abnormal fetal heart rate pattern  Description: Absence of abnormal fetal heart rate pattern  Outcome: Ongoing  Note: FHT's will continue with mod variability and spontaneous accels     Problem: Urinary Retention:  Goal: Experiences of bladder distention will decrease  Description: Experiences of bladder distention will decrease  Outcome: Ongoing  Goal: Urinary elimination within specified parameters  Description: Urinary elimination within specified parameters  Outcome: Ongoing  Note: Pt will continue to void qs   Care plan reviewed with patient and family. Patient and family verbalize understanding of the plan of care and contribute to goal setting.

## 2022-01-05 NOTE — FLOWSHEET NOTE
Dr. Kristina Breaux calls unit. Updated that pt linus came out at 0230 and was jennifer at that time about every 2 min. Got an epidural and was 5/80/-1 and intact. FHT reactive, ctx 3-4 min. VSS. Pitocin infusing. FOB positive for HSV and pt did not take any acyclovir during pregnancy. Pt is afebrile and dr. Hailey Patel did an exam and saw no lesions. No new orders received. MD will be in for SROM.

## 2022-01-05 NOTE — L&D DELIVERY NOTE
Department of Obstetrics and Gynecology   Vaginal Delivery Note at [facility]      Date of Delivery:  2022    Procedure:  Spontaneous vaginal delivery and Repair second degree spontaneous laceration    Surgeon:  Joaquina Jade MD    Anesthesia:  epidural anesthesia    Estimated blood loss:  350ml    Cord blood sent Yes    Complications:  Lupus and maternal elevated temperature    Condition:  infant stable to general nursery and mother stable    Details of Procedure: The patient is a 23 y.o.  female at 37w11d  who was admitted for IOL. She received the following interventions: ARBOW, IV Pitocin induction and barriga bulb cervical ripening. The patient progressed well,did receive an epidural, became complete and started to push. Patient progressed well and the fetal head was delivered over an intact perineum, and the rest of the infant delivered atraumatically, placed on mother abdomen. The cord was clamped and cut after 1 minute and the crying infant placed skin to skin with the waiting nurse at bedside for evaluation. Cord blood and cord segment were collected. The delivery of the placenta was spontaneous. The perineum and vagina were explored and a second degree laceration was repaired in standard fashion with 3-0 vicryl. A vaginal sweep was completed and two sharps were placed in the proper container. Sponge count correct. Infant Wt: pending    APGARS:     Kimani Gonzales [[id number]]    Apgars    Living status: Living  Apgars   1 Minute:  5 Minute:  10 Minute 15 Minute 20 Minute   Skin Color: 0  1       Heart Rate: 2  2       Reflex Irritability: 2  2       Muscle Tone: 2  2       Respiratory Effort: 1  2       Total: 7  9               Apgars Assigned By: MILLIE Mosqueda [address] RN              Electronically signed by Joaquina Jade MD on 2022 at 2:36 PM

## 2022-01-05 NOTE — H&P
[address] [address]  History and Physical Update    Pt Name: Remi Miranda  MRN: [id number]  YOB: 1995  Date of evaluation: 2022    [] I have examined the patient and reviewed the H&P/Consult and there are no changes to the patient or plans. [] I have examined the patient and reviewed the H&P/Consult and have noted the following changes:   24 yo  at 44 + weeks for IOL secondary to  Possible Lupusand elevated BPs in the office and term. Pt s  has HSV though she does not. She did not take the prescribed acyclovir. Cvx and vagina without any lesions   FHTs init 150 reactive. Now tachycardic with baseline 165 anad moderate variability with some accelerations. Cvx1/60/02 ant moderately firm  Some irreg contractions    Will wait to place barriga until baseline returns to nl. Disc with Dr Davian Wright and Dr Bhumi Luna. Discussion with the patient and/ or family for proposed care, treatment, services; benefits, risks, side effects; likelihood of achieving goals and potential problems that may occur during recuperation was had and all questions were answered. Discussion with the patient and/ or family of reasonable alternatives to the proposed care, treatment, services and the discussion of the risks, benefits, side effects related to the alternatives and the risk related to not receiving the proposed care treatment services was also had and all questions were answered. If this is for an elective surgical procedure then The patient was counseled at length about the risks of jennifer Covid-19 during their perioperative period and any recovery window from their procedure. The patient was made aware that jennifer Covid-19  may worsen their prognosis for recovering from their procedure  and lend to a higher morbidity and/or mortality risk. All material risks, benefits, and reasonable alternatives including postponing the procedure were discussed.  The patient  does wish to proceed with the procedure at this time.              Jl Cummins MD,MD  Electronically signed 1/4/2022 at 9:01 PM

## 2022-01-05 NOTE — PLAN OF CARE
Problem: Anxiety:  Goal: Level of anxiety will decrease  Description: Level of anxiety will decrease  1/5/2022 0805 by Shelbie Beach RN  Outcome: Ongoing  Note: Appears calm and relaxed. Problem: Breathing Pattern - Ineffective:  Goal: Able to breathe comfortably  Description: Able to breathe comfortably  1/5/2022 0805 by Shelbie Beach RN  Outcome: Ongoing  Note: REsp easy and regular. Problem: Fluid Volume - Imbalance:  Goal: Absence of intrapartum hemorrhage signs and symptoms  Description: Absence of intrapartum hemorrhage signs and symptoms  1/5/2022 0805 by Shelbie Beahc RN  Outcome: Ongoing  Note: Vitals stable. No active bleeding. Problem: Infection - Intrapartum Infection:  Goal: Will show no infection signs and symptoms  Description: Will show no infection signs and symptoms  1/5/2022 0805 by Shelbie Beach RN  Outcome: Ongoing  Note: Vitals stable. Afebrile. Problem: Labor Process - Prolonged:  Goal: Uterine contractions within specified parameters  Description: Uterine contractions within specified parameters  1/5/2022 0805 by Shelbie Beach RN  Outcome: Ongoing  Note: Contractions every 2-3min, mild. Problem: Tissue Perfusion - Uteroplacental, Altered:  Goal: Absence of abnormal fetal heart rate pattern  Description: Absence of abnormal fetal heart rate pattern  1/5/2022 0805 by Shelbie Beach RN  Outcome: Ongoing  Note: Has reactive strip. Problem: Urinary Retention:  Goal: Experiences of bladder distention will decrease  Description: Experiences of bladder distention will decrease  1/5/2022 0805 by Shelbie Beach RN  Outcome: Ongoing  Note: Cardona draining clear yellow urine.      Problem: Pain:  Goal: Control of acute pain  Description: Control of acute pain  1/5/2022 0805 by Shelbie Beach RN  Outcome: Not Met This Shift     Problem: Pain:  Goal: Control of chronic pain  Description: Control of chronic pain  1/5/2022 0805 by Shelbie Beach RN  Outcome: Not Met This Shift     Problem: Pain:  Goal: Pain level will decrease  Description: Pain level will decrease  2022 by Lucille Romero RN  Outcome: Completed     Problem: Labor Process - Prolonged:  Goal: Labor progression, first stage, within specified pattern  Description: Labor progression, first stage, within specified pattern  2022 by Lucille Romero RN  Outcome: Completed     Problem: Labor Process - Prolonged:  Goal: Labor progession, second stage, within specified pattern  Description: Labor progession, second stage, within specified pattern  2022 by Lucille Romero RN  Outcome: Completed     Problem:  Screening:  Goal: Ability to make informed decisions regarding treatment has improved  Description: Ability to make informed decisions regarding treatment has improved  2022 by Lucille Romero RN  Outcome: Completed     Problem: Pain - Acute:  Goal: Pain level will decrease  Description: Pain level will decrease  2022 by Lucille Romero RN  Outcome: Completed     Problem: Pain - Acute:  Goal: Able to cope with pain  Description: Able to cope with pain  2022 by Lucille Romero RN  Outcome: Completed     Problem: Urinary Retention:  Goal: Urinary elimination within specified parameters  Description: Urinary elimination within specified parameters  2022 by Lucille Romero RN  Outcome: Completed  Note: Cardona draining clear yellow urine. Care plan reviewed with patient and family Patient and family verbalize understanding of the plan of care and contribute to goal setting.

## 2022-01-05 NOTE — FLOWSHEET NOTE
Patient arrived to [phone] via wheelchair with  in arms. Report received from  [facility]. Patient oriented to room, call light, plan of care. Patient stated she would like to void up to bathroom. Large successful void small amount of lochia noted no clots. Patient back to bed fundus midline firm at U/-1. Due to void x1.  Bette Suh RN

## 2022-01-05 NOTE — FLOWSHEET NOTE
Dr Eileen Arciniega called in for update. Informed that pt was 5 and 90 %. Just out of knee chest positioned. Requesting redose.

## 2022-01-05 NOTE — OP NOTE
[address] [place] [address]                                OPERATIVE REPORT    PATIENT NAME: Guera Peralta                   :        1995  MED REC NO:   [id number]                           ROOM:       0001  ACCOUNT NO:   [de-identified]                           ADMIT DATE: 2022  PROVIDER:     KATE Liang Cleaves:  2022    SURGEON:  Francois lAlen MD    NOTE:  The patient is a 45-year-old, G1, P0, who presents to Labor and  Delivery at 39 plus weeks for induction of labor. The patient is a  patient of Dr. Phyllis Cornelius. She had a Cardona placed in the  cervix under sterile conditions without complication. A 45 mL of fluid  was placed and the _____ induction. Fetal heart tones were reactive  prior to placement as well as after the procedure.         Jasmine Cotton M.D.    D: 2022 22:36:41       T: 2022 0:19:01     FELIBERTO/JACE  Job#: [id number]     Doc#: [id number]    CC:

## 2022-01-05 NOTE — PROGRESS NOTES
Patient seen at bedside. She is a  at 39w5d. Presents for IOL via Cardona ripening. Reports positive fetal movement. Denies contractions, leakage of fluid, or edema. FHT with moderate variability and accelerations, no decels noted.   GBS negative  Blood type A+    Assessment: Full-term      Plan: Complete Cervical exam at time of Cardona placement   - Proceed with IOL and Cardona ripening as scheduled   - Continue monitoring patient

## 2022-01-05 NOTE — ANESTHESIA PRE PROCEDURE
Department of Anesthesiology  Preprocedure Note       Name:  Kat Mary   Age:  23 y.o.  :  1995                                          MRN:  [id number]         Date:  2022      Surgeon: * No surgeons listed *    Procedure: * No procedures listed *    Medications prior to admission:   Prior to Admission medications    Medication Sig Start Date End Date Taking?  Authorizing Provider   Prenatal Vit-Fe Fumarate-FA (PRENATAL 1+1 PO) Take by mouth   Yes Historical Provider, MD   aspirin 81 MG chewable tablet Take 81 mg by mouth daily   Yes Historical Provider, MD   ferrous fumarate-vitamin c (LANEY-SEQUELS) 65-25 MG TBCR CR tablet Take 1 tablet by mouth daily (with breakfast)   Yes Historical Provider, MD   ibuprofen (ADVIL;MOTRIN) 600 MG tablet Take 1 tablet by mouth every 6 hours as needed for Pain 16  Yes REJI Nick CNP   DULoxetine HCl (CYMBALTA PO) Take 2 tablets by mouth nightly    Historical Provider, MD   traMADol (ULTRAM) 50 MG tablet Take 25 mg by mouth every 6 hours as needed for Pain     Historical Provider, MD   Hydroxychloroquine Sulfate (PLAQUENIL PO) Take by mouth 2 times daily     Historical Provider, MD       Current medications:    Current Facility-Administered Medications   Medication Dose Route Frequency Provider Last Rate Last Admin    ePHEDrine injection 10 mg  10 mg IntraVENous Once Agustín Whipple MD        fentaNYL 750 mcg, ropivacaine 0.1% in sodium chloride 0.9% 265mL (OB) epidural  12 mL/hr Epidural Continuous CollinREJI Aguirre - CRNA        naloxone [facility]) injection 0.4 mg  0.4 mg IntraVENous PRN Collin Powers APRN - CRNA        ondansetron [facility]) injection 4 mg  4 mg IntraVENous Q6H PRN REJI Eng - CRNA        oxytocin (PITOCIN) 30 units in 500 mL infusion  1 sergio-units/min IntraVENous Continuous Jus Membreno MD 1 mL/hr at 22 020 1 sergio-units/min at 22 020    lactated ringers infusion   IntraVENous Continuous Ulises Elliott  mL/hr at 01/04/22 2208 New Bag at 01/04/22 2208    lactated ringers bolus  500 mL IntraVENous PRN Ulises Elliott MD        Or    lactated ringers bolus  1,000 mL IntraVENous PRN Ulises Elliott MD        sodium chloride flush 0.9 % injection 5-40 mL  5-40 mL IntraVENous 2 times per day Ulises Elliott MD        sodium chloride flush 0.9 % injection 5-40 mL  5-40 mL IntraVENous PRN Ulises Elliott MD        0.9 % sodium chloride infusion  25 mL IntraVENous PRN Ulises Elliott MD        lidocaine PF 1 % injection 30 mL  30 mL Other PRN Ulises Elliott MD        butorphanol (STADOL) injection 1 mg  1 mg IntraVENous Q1H PRN Ulises Elliott MD   1 mg at 01/05/22 0023    nitrous oxide 50% inhalation 1 each  1 each Inhalation Continuous PRN Ulises Elliott MD        [facility]ansetron [facility]) injection 8 mg  8 mg IntraVENous Q6H PRN Ulises Elliott MD   8 mg at 01/05/22 0038    diphenhydrAMINE (BENADRYL) injection 25 mg  25 mg IntraVENous Q4H PRN Ulises Elliott MD        methylergonovine (METHERGINE) injection 200 mcg  200 mcg IntraMUSCular PRN Ulises Elliott MD        carboprost (HEMABATE) injection 250 mcg  250 mcg IntraMUSCular PRN Ulises Elliott MD        miSOPROStol (CYTOTEC) tablet 1,000 mcg  1,000 mcg Rectal PRN Ulises Elliott MD        ibuprofen (ADVIL;MOTRIN) tablet 800 mg  800 mg Oral Q8H PRN Ulises Elliott MD        morphine (PF) injection 2 mg  2 mg IntraVENous Q2H PRN Ulises Elliott MD        Or    morphine injection 4 mg  4 mg IntraVENous Q2H PRN Ulises Elliott MD        witch hazel-glycerin (TUCKS) pad   Topical PRN Ulises Elliott MD        benzocaine-menthol (DERMOPLAST) 20-0.5 % spray   Topical PRN Ulises Elliott MD         Facility-Administered Medications Ordered in Other Encounters   Medication Dose Route Frequency Provider Last Rate Last Admin    fentaNYL 750 mcg, ropivacaine 0.1% in sodium chloride 0.9% 265mL (OB) epidural   Epidural PRN Taina Regalado APRN - CRNA   15 mL at 01/05/22 0407    fentaNYL (SUBLIMAZE) injection   IntraVENous PRN Taina Regalado APRN - CRNA   100 mcg at 01/05/22 0407    ropivacaine (NAROPIN) 0.2% injection 0.2%   Epidural PRN Taina Regalado APRN - CRNA   5 mL at 01/05/22 0407       Allergies: Allergies   Allergen Reactions    Percocet [Oxycodone-Acetaminophen] Itching       Problem List:    Patient Active Problem List   Diagnosis Code    Celiac sprue K90.0    Systemic lupus erythematosus ([facility] [address] M32.9       Past Medical History:        Diagnosis Date    Anemia     Autoimmune disorder ([facility] [address]     Diarrhea     Hypertension     in last couple weeks    Systemic lupus erythematosus ([facility] [address]     since 2016    Weight loss        Past Surgical History:  History reviewed. No pertinent surgical history. Social History:    Social History     Tobacco Use    Smoking status: Former Smoker     Packs/day: 0.25     Types: Cigarettes    Smokeless tobacco: Never Used   Substance Use Topics    Alcohol use: No                                Counseling given: Not Answered      Vital Signs (Current):   Vitals:    01/05/22 0230 01/05/22 0300 01/05/22 0405 01/05/22 0410   BP: 131/84 135/80 129/73 (!) 103/52   Pulse: 89 93 96 130   Resp: 18 18 18 18   Temp:  36.4 °C (97.6 °F)     TempSrc:  Temporal     SpO2:   99%    Weight:       Height:                                                  BP Readings from Last 3 Encounters:   01/05/22 (!) 103/52   12/14/16 119/70   07/12/16 103/60       NPO Status:                                                                                 BMI:   Wt Readings from Last 3 Encounters:   01/04/22 205 lb (93 kg)   12/14/16 130 lb (59 kg)   05/19/16 114 lb 4.8 oz (51.8 kg)     Body mass index is 32.11 kg/m².     CBC:   Lab Results   Component Value Date    WBC 8.8 01/04/2022    RBC CRNA   1/5/2022

## 2022-01-05 NOTE — FLOWSHEET NOTE
, 44 5/7 wk, to unit for barriga induction due elevated blood pressures. Was noted on prenatal that pt was + for [facility] on her first visit but quit after found oout she was pregnant. Significant other is positive for HSV; pt was prescribed acylavir at 36 weeks but did not take it with her reasoning being that she herself has not had any outbreaks as such. Explained that you don't have to have any outbreaks to pass it on to someone else and it can be very bad to babies who are born with it. Not sure pt understands the entire scheme of this situation. Both grandmothers are in the room and they DO NOT know about either the [facility] or the HSV and pt and s/o prefer that they don't know about this.

## 2022-01-05 NOTE — PLAN OF CARE
Problem: Discharge Planning:  Goal: Discharged to appropriate level of care  Description: Discharged to appropriate level of care  Outcome: Ongoing  Note: Plan is to be discharged home with significant other. Problem: Constipation:  Goal: Bowel elimination is within specified parameters  Description: Bowel elimination is within specified parameters  Outcome: Ongoing  Note: Patient has not passed gas. Encouraged po fluids ambulation. Problem: Fluid Volume - Imbalance:  Goal: Absence of imbalanced fluid volume signs and symptoms  Description: Absence of imbalanced fluid volume signs and symptoms  Outcome: Ongoing  Note: Vaginal bleeding WNL, Fundus firm and midline, no clots or foul odors. Problem: Infection - Risk of, Puerperal Infection:  Goal: Will show no infection signs and symptoms  Description: Will show no infection signs and symptoms  Outcome: Ongoing  Note: Afebrile this shift. Problem: Mood - Altered:  Goal: Mood stable  Description: Mood stable  Outcome: Ongoing  Note: Emotional support provided. Problem: Pain - Acute:  Goal: Pain level will decrease  Description: Pain level will decrease  Outcome: Ongoing  Note: Scheduled motrin given with relief. Pain goal of 3 met this shift. Tucks a dermaplast spray to perineum. Care plan reviewed with patient and she contributes to goal setting and voices understanding of plan of care.

## 2022-01-05 NOTE — PROGRESS NOTES
Called for delivery. On arrival at bedside, pt complete and +4-+5 with pushing. Maternal temp of 100+. Ordered tylenol. FHTs: 175, mod shelley, no accels, variable decelerations toco: q2 min. Category II tracing. Overall reassuring. Anticipate .      Chandrakant Garcia MD  2:36 PM  2022

## 2022-01-05 NOTE — FLOWSHEET NOTE
Dr. Santa Abad in room for SVE. Dr. Santa Abad asked family members to step out of the room, except FOB to review FOB hx HSV and pt not compliant with acyclovir medication during pregnancy. Exam was done by MD to assess for lesions. No external lesions. Speculum exam done by MD. No internal lesions found. SVE completed. 1/60/-2.

## 2022-01-06 PROCEDURE — 6370000000 HC RX 637 (ALT 250 FOR IP): Performed by: OBSTETRICS & GYNECOLOGY

## 2022-01-06 PROCEDURE — 1220000000 HC SEMI PRIVATE OB R&B

## 2022-01-06 RX ADMIN — IBUPROFEN 800 MG: 800 TABLET, FILM COATED ORAL at 12:47

## 2022-01-06 RX ADMIN — IBUPROFEN 800 MG: 800 TABLET, FILM COATED ORAL at 04:51

## 2022-01-06 RX ADMIN — ACETAMINOPHEN 650 MG: 325 TABLET ORAL at 20:19

## 2022-01-06 RX ADMIN — DOCUSATE SODIUM 100 MG: 100 CAPSULE ORAL at 20:19

## 2022-01-06 RX ADMIN — DOCUSATE SODIUM 100 MG: 100 CAPSULE ORAL at 08:28

## 2022-01-06 ASSESSMENT — PAIN SCALES - GENERAL
PAINLEVEL_OUTOF10: 3
PAINLEVEL_OUTOF10: 5
PAINLEVEL_OUTOF10: 7

## 2022-01-06 NOTE — PLAN OF CARE
Problem: Discharge Planning:  Goal: Discharged to appropriate level of care  Description: Discharged to appropriate level of care  1/5/2022 2212 by Charles Lake RN  Outcome: Ongoing  Note: Remains in hospital, discussed possible discharge needs. Problem: Constipation:  Goal: Bowel elimination is within specified parameters  Description: Bowel elimination is within specified parameters  1/5/2022 2212 by Charles Lake RN  Outcome: Ongoing  Note: Taking stool softeners and increasing fiber and fluid in diet. Ambulation encouraged. Problem: Fluid Volume - Imbalance:  Goal: Absence of postpartum hemorrhage signs and symptoms  Description: Absence of postpartum hemorrhage signs and symptoms  Outcome: Ongoing  Note: Vaginal bleeding WNL, Fundus firm and midline, no clots or foul odors. Problem: Infection - Risk of, Puerperal Infection:  Goal: Will show no infection signs and symptoms  Description: Will show no infection signs and symptoms  1/5/2022 2212 by Charles Lake RN  Outcome: Ongoing  Note: Vital signs and assessments WNL. Vaginal bleeding WNL, Fundus firm and midline, no clots or foul odors. Problem: Mood - Altered:  Goal: Mood stable  Description: Mood stable  1/5/2022 2212 by Charles Lake RN  Outcome: Ongoing  Note: Bonding with baby, participating in infant care. Problem: Pain - Acute:  Goal: Pain level will decrease  Description: Pain level will decrease  1/5/2022 2212 by Charles Lake RN  Outcome: Ongoing  Note: Pain controlled with po meds. Discussed ice for perineal pain and/or the use of warm blanket/heating pad for uterine cramps. Pt states her pain goal 3/10 has been met. Care plan reviewed with patient and she contributes to goal setting and voices understanding of plan of care.

## 2022-01-06 NOTE — LACTATION NOTE
Educated pt. On supply and demand. Reviewed breastfeeding booklet with pt. Discussed feeding cues. Encouraged pt. To call lactation for assistance.

## 2022-01-06 NOTE — ANESTHESIA POSTPROCEDURE EVALUATION
Department of Anesthesiology  Postprocedure Note    Patient: Arthuro Massed  MRN: [id number]  YOB: 1995  Date of evaluation: 1/6/2022  Time:  9:20 AM     Procedure Summary     Date: 01/05/22 Room / Location:     Anesthesia Start: 5477 Anesthesia Stop: 2131    Procedure: Labor Analgesia Diagnosis:     Scheduled Providers:  Responsible Provider: Raiza Moreno MD    Anesthesia Type: epidural ASA Status: 2          Anesthesia Type: No value filed. Haylee Phase I: Haylee Score: 10    Haylee Phase II: Haylee Score: 10    Last vitals: Reviewed and per EMR flowsheets.        Anesthesia Post Evaluation    Patient location during evaluation: bedside  Patient participation: complete - patient participated  Level of consciousness: awake and alert  Airway patency: patent  Nausea & Vomiting: no nausea and no vomiting  Complications: no  Cardiovascular status: hemodynamically stable  Respiratory status: spontaneous ventilation, room air and nonlabored ventilation  Hydration status: stable

## 2022-01-06 NOTE — PROGRESS NOTES
Department of Obstetrics and Gynecology  Labor and Delivery  Attending Post Partum Progress Note    PPD #1    SUBJECTIVE: Feeling well. No complaints. Pain well controlled. Voiding spontaneously. Tolerating regular diet. Ambulating without lightheadedness. Plans to stay until PPD#2. OBJECTIVE:     Vitals:  /79   Pulse 80   Temp 98.1 °F (36.7 °C) (Oral)   Resp 16   Ht 5' 7\" (1.702 m)   Wt 205 lb (93 kg)   SpO2 97%   Breastfeeding Unknown   BMI 32.11 kg/m²     Uterus:  Midline, U+1, firm, non-tender    DATA:    Hemoglobin:   Lab Results   Component Value Date    HGB 13.0 01/04/2022       ASSESSMENT & PLAN:    Doing well.   Continue routine PP care  Anticipate D/C home on PPD#2     Saadia Brantley DO 1/6/2022

## 2022-01-06 NOTE — FLOWSHEET NOTE
Infant has not roomed in the entire shift due to maternal exhaustion. Benefits of rooming in provided. Will continue to reinforce education.

## 2022-01-07 VITALS
RESPIRATION RATE: 18 BRPM | BODY MASS INDEX: 32.18 KG/M2 | DIASTOLIC BLOOD PRESSURE: 68 MMHG | WEIGHT: 205 LBS | OXYGEN SATURATION: 99 % | HEART RATE: 98 BPM | SYSTOLIC BLOOD PRESSURE: 122 MMHG | HEIGHT: 67 IN | TEMPERATURE: 98.1 F

## 2022-01-07 PROCEDURE — 6370000000 HC RX 637 (ALT 250 FOR IP): Performed by: OBSTETRICS & GYNECOLOGY

## 2022-01-07 RX ORDER — ACETAMINOPHEN 325 MG/1
650 TABLET ORAL EVERY 6 HOURS PRN
Qty: 120 TABLET | Refills: 3 | COMMUNITY
Start: 2022-01-07 | End: 2022-11-02

## 2022-01-07 RX ADMIN — IBUPROFEN 800 MG: 800 TABLET, FILM COATED ORAL at 06:39

## 2022-01-07 RX ADMIN — DOCUSATE SODIUM 100 MG: 100 CAPSULE ORAL at 10:08

## 2022-01-07 ASSESSMENT — PAIN SCALES - GENERAL: PAINLEVEL_OUTOF10: 7

## 2022-01-07 NOTE — PROGRESS NOTES
Department of Obstetrics and Gynecology  Progress Note      S: doing well. No complaints. Lochia appropriate. Denies cp, sob, ct. Questions about feeling shaky. O:   Vitals:    22 0030   BP: 118/71   Pulse: 85   Resp: 18   Temp: 98 °F (36.7 °C)   SpO2:        Gen: no acute distress   Resp: breathing unlabored     A: 23 y.o.   PPD#1 s/p , doing well. P:   1. A POS   2. Con't postpartum care   3. d/c home today-f/u in 6 wks.      Vikki Lehman MD  8:38 AM  2022

## 2022-01-07 NOTE — PLAN OF CARE
Problem: Discharge Planning:  Goal: Discharged to appropriate level of care  Description: Discharged to appropriate level of care  Outcome: Ongoing  Note: Remains in hospital, discussed possible discharge needs. Problem: Constipation:  Goal: Bowel elimination is within specified parameters  Description: Bowel elimination is within specified parameters  Outcome: Ongoing  Note: Taking stool softeners and increasing fiber and fluid in diet. Ambulation encouraged.     Problem: Fluid Volume - Imbalance:  Goal: Absence of postpartum hemorrhage signs and symptoms  Description: Absence of postpartum hemorrhage signs and symptoms  Outcome: Ongoing  Note: Vaginal bleeding WNL, Fundus firm and midline, no clots or foul odors.     Problem: Infection - Risk of, Puerperal Infection:  Goal: Will show no infection signs and symptoms  Description: Will show no infection signs and symptoms  Outcome: Ongoing  Note: Vital signs and assessments WNL. Vaginal bleeding WNL, Fundus firm and midline, no clots or foul odors. Problem: Mood - Altered:  Goal: Mood stable  Description: Mood stable  Outcome: Ongoing  Note: Bonding with baby, participating in infant care. Problem: Pain - Acute:  Goal: Pain level will decrease  Description: Pain level will decrease  Outcome: Ongoing  Note: Pain controlled with po meds. Discussed ice for perineal pain and/or the use of warm blanket/heating pad for uterine cramps. Pt states her pain goal 3/10 has been met.      Care plan reviewed with patient and she contributes to goal setting and voices understanding of plan of care.

## 2022-01-07 NOTE — LACTATION NOTE
Met with pt briefly as she was preparing to leave for home. Offered support, gave feeding frequency and encouraged feeds on demand 8-12 times in 24hr.

## 2022-01-07 NOTE — FLOWSHEET NOTE
Discharge prescriptions given to pt with instructions on use and side effects. See AVS. Pt verbalized understanding of medications. Postpartum  teaching completed and forms signed by patient. Copy witnessed by RN and given to patient. Patient verbalized understanding of all teaching points. Patient plans to follow-up with [facility] Provider as instructed. Patient verbalizes understanding of discharge instructions and denies further questions. ID bands checked. Patient discharged in stable condition accompanied by family/guardian. Discharged in wheelchair, holding baby in arms.

## 2022-01-07 NOTE — PLAN OF CARE
Problem: Discharge Planning:  Goal: Discharged to appropriate level of care  Description: Discharged to appropriate level of care  1/7/2022 1124 by Bebeto Mosley RN  Outcome: Ongoing  Note: Working towards discharge today per order. Ducks in a row discussed     Problem: Constipation:  Goal: Bowel elimination is within specified parameters  Description: Bowel elimination is within specified parameters  1/7/2022 1124 by Bebeto Mosley RN  Outcome: Ongoing  Note: Taking colace as ordered, increasing fluids and fiber in diet. Problem: Fluid Volume - Imbalance:  Goal: Absence of postpartum hemorrhage signs and symptoms  Description: Absence of postpartum hemorrhage signs and symptoms  1/7/2022 1124 by Bebeto Mosley RN  Outcome: Ongoing  Note: Scant amount of vaginal bleeding no clots passed     Problem: Infection - Risk of, Puerperal Infection:  Goal: Will show no infection signs and symptoms  Description: Will show no infection signs and symptoms  1/7/2022 1124 by Bebeto Mosley RN  Outcome: Ongoing  Note: No signs or symptoms of infection noted     Problem: Mood - Altered:  Goal: Mood stable  Description: Mood stable  1/7/2022 1124 by Bebeto Mosley RN  Outcome: Ongoing  Note: Mood stable and cooperative     Problem: Pain - Acute:  Goal: Pain level will decrease  Description: Pain level will decrease  1/7/2022 1124 by Bebeto Mosley RN  Outcome: Ongoing  Note: Denies pain this am with assessment . Discussed may have oral pain medication, ice or heat for comfort. Pain goal a 3/10 has been met this shift. Care plan reviewed with patient and she contributes to goal setting and voices understanding of plan of care.

## 2022-01-07 NOTE — FLOWSHEET NOTE
Post birth warning signs education paper given and reviewed, teaching complete. [place] postpartum depression screening discussed with patient, instructed to contact her healthcare provider if her score is > 10. Patient voiced understanding. Mother's blood type is A+. Baby's blood type is N/A.   Mother did not receive Rhogam.

## 2022-02-09 ENCOUNTER — NURSE ONLY (OUTPATIENT)
Dept: LAB | Age: 27
End: 2022-02-09

## 2022-03-30 ENCOUNTER — HOSPITAL ENCOUNTER (OUTPATIENT)
Dept: GENERAL RADIOLOGY | Age: 27
Discharge: HOME | End: 2022-03-30
Payer: COMMERCIAL

## 2022-03-30 ENCOUNTER — HOSPITAL ENCOUNTER (OUTPATIENT)
Age: 27
Discharge: HOME | End: 2022-03-30
Payer: COMMERCIAL

## 2022-03-30 DIAGNOSIS — M25.50 POLYARTHRALGIA: ICD-10-CM

## 2022-03-30 LAB
C-REACTIVE PROTEIN: 0.78 MG/DL (ref 0–1)
RHEUMATOID FACTOR: 14 IU/ML (ref 0–13)
SEDIMENTATION RATE, ERYTHROCYTE: 15 MM/HR (ref 0–20)

## 2022-03-30 PROCEDURE — 86430 RHEUMATOID FACTOR TEST QUAL: CPT

## 2022-03-30 PROCEDURE — 73130 X-RAY EXAM OF HAND: CPT

## 2022-03-30 PROCEDURE — 86140 C-REACTIVE PROTEIN: CPT

## 2022-03-30 PROCEDURE — 36415 COLL VENOUS BLD VENIPUNCTURE: CPT

## 2022-03-30 PROCEDURE — 85651 RBC SED RATE NONAUTOMATED: CPT

## 2022-10-17 ENCOUNTER — HOSPITAL ENCOUNTER (OUTPATIENT)
Dept: MRI IMAGING | Age: 27
Discharge: HOME | End: 2022-10-17
Payer: COMMERCIAL

## 2022-10-17 DIAGNOSIS — L93.0 DISCOID LUPUS ERYTHEMATOSUS: ICD-10-CM

## 2022-10-17 DIAGNOSIS — R20.0 ANESTHESIA OF SKIN: ICD-10-CM

## 2022-10-17 PROCEDURE — 70553 MRI BRAIN STEM W/O & W/DYE: CPT

## 2022-10-17 PROCEDURE — 6360000004 HC RX CONTRAST MEDICATION

## 2022-10-17 PROCEDURE — A9579 GAD-BASE MR CONTRAST NOS,1ML: HCPCS

## 2022-10-17 RX ADMIN — GADOTERIDOL 17 ML: 279.3 INJECTION, SOLUTION INTRAVENOUS at 11:07

## 2022-11-02 ENCOUNTER — OFFICE VISIT (OUTPATIENT)
Dept: RHEUMATOLOGY | Age: 27
End: 2022-11-02
Payer: COMMERCIAL

## 2022-11-02 ENCOUNTER — NURSE ONLY (OUTPATIENT)
Dept: LAB | Age: 27
End: 2022-11-02

## 2022-11-02 VITALS
BODY MASS INDEX: 28.72 KG/M2 | OXYGEN SATURATION: 98 % | DIASTOLIC BLOOD PRESSURE: 76 MMHG | SYSTOLIC BLOOD PRESSURE: 110 MMHG | WEIGHT: 183 LBS | HEIGHT: 67 IN | HEART RATE: 79 BPM

## 2022-11-02 DIAGNOSIS — M32.9 SYSTEMIC LUPUS ERYTHEMATOSUS, UNSPECIFIED SLE TYPE, UNSPECIFIED ORGAN INVOLVEMENT STATUS (HCC): Primary | ICD-10-CM

## 2022-11-02 DIAGNOSIS — M32.9 SYSTEMIC LUPUS ERYTHEMATOSUS, UNSPECIFIED SLE TYPE, UNSPECIFIED ORGAN INVOLVEMENT STATUS (HCC): ICD-10-CM

## 2022-11-02 LAB
ALBUMIN SERPL-MCNC: 4.5 G/DL (ref 3.5–5.1)
ALP BLD-CCNC: 126 U/L (ref 38–126)
ALT SERPL-CCNC: 13 U/L (ref 11–66)
ANION GAP SERPL CALCULATED.3IONS-SCNC: 11 MEQ/L (ref 8–16)
AST SERPL-CCNC: 17 U/L (ref 5–40)
BACTERIA: NORMAL
BASOPHILS # BLD: 0.2 %
BASOPHILS ABSOLUTE: 0 THOU/MM3 (ref 0–0.1)
BILIRUB SERPL-MCNC: 0.4 MG/DL (ref 0.3–1.2)
BILIRUBIN URINE: NEGATIVE
BLOOD, URINE: NEGATIVE
BUN BLDV-MCNC: 8 MG/DL (ref 7–22)
C-REACTIVE PROTEIN: 0.73 MG/DL (ref 0–1)
C3 COMPLEMENT: 133 MG/DL (ref 90–180)
CALCIUM SERPL-MCNC: 9.5 MG/DL (ref 8.5–10.5)
CASTS: NORMAL /LPF
CASTS: NORMAL /LPF
CHARACTER, URINE: CLEAR
CHLORIDE BLD-SCNC: 105 MEQ/L (ref 98–111)
CO2: 25 MEQ/L (ref 23–33)
COLOR: YELLOW
COMPLEMENT C4: 28 MG/DL (ref 10–40)
CREAT SERPL-MCNC: 0.5 MG/DL (ref 0.4–1.2)
CREATININE URINE: 132.6 MG/DL
CRYSTALS: NORMAL
EOSINOPHIL # BLD: 1.6 %
EOSINOPHILS ABSOLUTE: 0.1 THOU/MM3 (ref 0–0.4)
EPITHELIAL CELLS, UA: NORMAL /HPF
ERYTHROCYTE [DISTWIDTH] IN BLOOD BY AUTOMATED COUNT: 13 % (ref 11.5–14.5)
ERYTHROCYTE [DISTWIDTH] IN BLOOD BY AUTOMATED COUNT: 40.1 FL (ref 35–45)
GFR SERPL CREATININE-BSD FRML MDRD: > 60 ML/MIN/1.73M2
GLUCOSE BLD-MCNC: 87 MG/DL (ref 70–108)
GLUCOSE, URINE: NEGATIVE MG/DL
HCT VFR BLD CALC: 42.8 % (ref 37–47)
HEMOGLOBIN: 13.4 GM/DL (ref 12–16)
IMMATURE GRANS (ABS): 0.02 THOU/MM3 (ref 0–0.07)
IMMATURE GRANULOCYTES: 0.3 %
KETONES, URINE: NEGATIVE
LEUKOCYTE ESTERASE, URINE: NEGATIVE
LYMPHOCYTES # BLD: 30.9 %
LYMPHOCYTES ABSOLUTE: 1.9 THOU/MM3 (ref 1–4.8)
MCH RBC QN AUTO: 26.9 PG (ref 26–33)
MCHC RBC AUTO-ENTMCNC: 31.3 GM/DL (ref 32.2–35.5)
MCV RBC AUTO: 85.9 FL (ref 81–99)
MISCELLANEOUS LAB TEST RESULT: NORMAL
MONOCYTES # BLD: 5.6 %
MONOCYTES ABSOLUTE: 0.3 THOU/MM3 (ref 0.4–1.3)
NITRITE, URINE: NEGATIVE
NUCLEATED RED BLOOD CELLS: 0 /100 WBC
PH UA: 7 (ref 5–9)
PLATELET # BLD: 244 THOU/MM3 (ref 130–400)
PMV BLD AUTO: 10 FL (ref 9.4–12.4)
POTASSIUM SERPL-SCNC: 4.2 MEQ/L (ref 3.5–5.2)
PROT/CREAT RATIO, UR: 0.08
PROTEIN UA: NEGATIVE MG/DL
PROTEIN, URINE: 10 MG/DL
RBC # BLD: 4.98 MILL/MM3 (ref 4.2–5.4)
RBC URINE: NORMAL /HPF
RENAL EPITHELIAL, UA: NORMAL
RHEUMATOID FACTOR: 12 IU/ML (ref 0–13)
SEDIMENTATION RATE, ERYTHROCYTE: 16 MM/HR (ref 0–20)
SEG NEUTROPHILS: 61.4 %
SEGMENTED NEUTROPHILS ABSOLUTE COUNT: 3.7 THOU/MM3 (ref 1.8–7.7)
SODIUM BLD-SCNC: 141 MEQ/L (ref 135–145)
SPECIFIC GRAVITY UA: 1.02 (ref 1–1.03)
TOTAL PROTEIN: 7 G/DL (ref 6.1–8)
UROBILINOGEN, URINE: 1 EU/DL (ref 0–1)
WBC # BLD: 6.1 THOU/MM3 (ref 4.8–10.8)
WBC UA: NORMAL /HPF
YEAST: NORMAL

## 2022-11-02 PROCEDURE — 1036F TOBACCO NON-USER: CPT | Performed by: INTERNAL MEDICINE

## 2022-11-02 PROCEDURE — G8484 FLU IMMUNIZE NO ADMIN: HCPCS | Performed by: INTERNAL MEDICINE

## 2022-11-02 PROCEDURE — 99204 OFFICE O/P NEW MOD 45 MIN: CPT | Performed by: INTERNAL MEDICINE

## 2022-11-02 PROCEDURE — G8427 DOCREV CUR MEDS BY ELIG CLIN: HCPCS | Performed by: INTERNAL MEDICINE

## 2022-11-02 PROCEDURE — G8417 CALC BMI ABV UP PARAM F/U: HCPCS | Performed by: INTERNAL MEDICINE

## 2022-11-02 RX ORDER — HYDROXYCHLOROQUINE SULFATE 200 MG/1
200 TABLET, FILM COATED ORAL DAILY
Qty: 30 TABLET | Refills: 1 | Status: SHIPPED | OUTPATIENT
Start: 2022-11-02 | End: 2022-12-02

## 2022-11-02 ASSESSMENT — ENCOUNTER SYMPTOMS
COUGH: 0
SHORTNESS OF BREATH: 0
NAUSEA: 0
ABDOMINAL PAIN: 0
VOMITING: 0
BLOOD IN STOOL: 0

## 2022-11-02 NOTE — PROGRESS NOTES
[facility]   Rheumatology Clinic Note      11/2/2022       Reason for Consult:  establish care  Requesting Physician:  Lucero Ramires MD     CHIEF COMPLAINT:    Chief Complaint   Patient presents with    New Patient     New pt lupus     On and off flares of knee, shoulders ect or other episodes where parts of body feel numb ect            HISTORY OF PRESENT ILLNESS:    33 y.o. female presents today to establish care. 1-2 months ago, she developed numbness in left side of her body. Lasted for couple of weeks and improved after prescribed prednisone. Was diagnosed with SLE in 2016. Former pt of Dr. Atul Casey. Was on Plaquenil. Has not taken it for few years. Her presentation includes: pain and swelling in her joints, unexplained weight loss (was having diarrhea and vomiting), skin rash, Raynaud's. Was doing well, until she had her baby. She delivered 10 months ago. Episodes of joint pain and swelling. Pain is most pronounced in hands and feet. Episodic. Also involving the knees. Has episodes of joint swelling. Has prolonged morning stiffness. Has skin rash in the face. No oral ulcers. No alopecia. No h/o miscarriages, had one pregnancy. No h/o blood clots. No pleurisy or pericarditis history. Past Medical History:     has a past medical history of Anemia, Autoimmune disorder ([address] Diarrhea, Hypertension, Systemic lupus erythematosus ([address] and Weight loss. Past Surgical History:     has no past surgical history on file. Current Medications:      Current Outpatient Medications:     hydroxychloroquine (PLAQUENIL) 200 MG tablet, Take 1 tablet by mouth daily, Disp: 30 tablet, Rfl: 1    Prenatal Vit-Fe Fumarate-FA (PRENATAL 1+1 PO), Take by mouth, Disp: , Rfl:     Allergies:    Percocet [oxycodone-acetaminophen]    Social History:     reports that she has quit smoking. Her smoking use included cigarettes. She smoked an average of .25 packs per day.  She has never used smokeless tobacco. She reports that she does not drink alcohol and does not use drugs. Family History:   family history includes Cancer in her paternal aunt. REVIEW OF SYSTEMS:    Review of Systems   Constitutional:  Positive for fatigue. Negative for chills and fever. HENT:  Negative for mouth sores. Respiratory:  Negative for cough and shortness of breath. Cardiovascular:  Negative for chest pain and leg swelling. Gastrointestinal:  Negative for abdominal pain, blood in stool, nausea and vomiting. Skin:  Negative for rash. PHYSICAL EXAM:    Vitals:    /76 (Site: Left Upper Arm, Position: Sitting, Cuff Size: Medium Adult)   Pulse 79   Ht 5' 7.01\" (1.702 m)   Wt 183 lb (83 kg)   SpO2 98%   Breastfeeding Yes   BMI 28.66 kg/m²     Physical Exam  Constitutional:       General: She is not in acute distress. Appearance: Normal appearance. HENT:      Mouth/Throat:      Mouth: Mucous membranes are moist.      Pharynx: Oropharynx is clear. Eyes:      Extraocular Movements: Extraocular movements intact. Conjunctiva/sclera: Conjunctivae normal.   Cardiovascular:      Rate and Rhythm: Normal rate and regular rhythm. Heart sounds: Normal heart sounds. No murmur heard. No friction rub. Pulmonary:      Effort: Pulmonary effort is normal. No respiratory distress. Breath sounds: Normal breath sounds. No wheezing or rhonchi. Musculoskeletal:         General: No swelling, tenderness or deformity. Normal range of motion. Cervical back: Normal range of motion and neck supple. Right lower leg: No edema. Left lower leg: No edema. Lymphadenopathy:      Cervical: No cervical adenopathy. Skin:     General: Skin is warm and dry. Findings: No lesion or rash. Neurological:      General: No focal deficit present. Mental Status: She is alert and oriented to person, place, and time. Cranial Nerves: No cranial nerve deficit.       Gait: Gait normal.   Psychiatric:         Mood and Affect: Mood normal.          DATA:                          IMPRESSION/RECOMMENDATIONS:      1. SLE: main active manifestations at this time is skin rash and joint pain/swelling.  -- will assess lupus serologies, complement levels, urine for protienuria  -- restart Plaquenil 200 mg once daily. Discussed with pt the benefits, risks and adverse effects of this medication. Patient expressed understanding. Pt is aware that she needs to have an annual eye exam to monitor for Plaquenil-eye toxicity    RTC in 6 weeks        Orders Placed This Encounter   Procedures    CBC with Auto Differential     Standing Status:   Future     Number of Occurrences:   1     Standing Expiration Date:   5/2/2023    Comprehensive Metabolic Panel     Standing Status:   Future     Number of Occurrences:   1     Standing Expiration Date:   5/2/2023    Sedimentation Rate     Standing Status:   Future     Number of Occurrences:   1     Standing Expiration Date:   5/2/2023    C-Reactive Protein     Standing Status:   Future     Number of Occurrences:   1     Standing Expiration Date:   5/2/2023    Rheumatoid Factor     Standing Status:   Future     Number of Occurrences:   1     Standing Expiration Date:   7/2/7035    Cyclic Citrul Peptide Antibody, IgG     Standing Status:   Future     Number of Occurrences:   1     Standing Expiration Date:   5/2/2023    Urinalysis with Microscopic     Standing Status:   Future     Number of Occurrences:   1     Standing Expiration Date:   5/2/2023     Order Specific Question:   SPECIFY(EX-CATH,MIDSTREAM,CYSTO,ETC)?      Answer:   midstream    C3 Complement     Standing Status:   Future     Number of Occurrences:   1     Standing Expiration Date:   5/2/2023    C4 Complement     Standing Status:   Future     Number of Occurrences:   1     Standing Expiration Date:   5/2/2023    Protein / creatinine ratio, urine     Standing Status:   Future     Number of Occurrences:   1 Standing Expiration Date:   5/2/2023        Josphine Merlin, MD    [address] [facility]  [address] [address]  [phone]

## 2022-11-05 LAB — CYCLIC CITRULLINATED PEPTIDE ANTIBODY IGG: 0.9 U/ML (ref 0–7)

## 2022-12-13 ENCOUNTER — NURSE ONLY (OUTPATIENT)
Dept: LAB | Age: 27
End: 2022-12-13

## 2022-12-14 ENCOUNTER — OFFICE VISIT (OUTPATIENT)
Dept: RHEUMATOLOGY | Age: 27
End: 2022-12-14
Payer: COMMERCIAL

## 2022-12-14 VITALS
OXYGEN SATURATION: 100 % | HEART RATE: 89 BPM | SYSTOLIC BLOOD PRESSURE: 124 MMHG | HEIGHT: 67 IN | BODY MASS INDEX: 28.72 KG/M2 | WEIGHT: 183 LBS | DIASTOLIC BLOOD PRESSURE: 70 MMHG

## 2022-12-14 DIAGNOSIS — M32.9 SYSTEMIC LUPUS ERYTHEMATOSUS, UNSPECIFIED SLE TYPE, UNSPECIFIED ORGAN INVOLVEMENT STATUS (HCC): ICD-10-CM

## 2022-12-14 PROCEDURE — 1036F TOBACCO NON-USER: CPT | Performed by: INTERNAL MEDICINE

## 2022-12-14 PROCEDURE — G8427 DOCREV CUR MEDS BY ELIG CLIN: HCPCS | Performed by: INTERNAL MEDICINE

## 2022-12-14 PROCEDURE — G8417 CALC BMI ABV UP PARAM F/U: HCPCS | Performed by: INTERNAL MEDICINE

## 2022-12-14 PROCEDURE — G8484 FLU IMMUNIZE NO ADMIN: HCPCS | Performed by: INTERNAL MEDICINE

## 2022-12-14 PROCEDURE — 99214 OFFICE O/P EST MOD 30 MIN: CPT | Performed by: INTERNAL MEDICINE

## 2022-12-14 RX ORDER — HYDROXYCHLOROQUINE SULFATE 200 MG/1
200 TABLET, FILM COATED ORAL DAILY
Qty: 30 TABLET | Refills: 5 | Status: SHIPPED | OUTPATIENT
Start: 2022-12-14 | End: 2023-01-13

## 2022-12-14 ASSESSMENT — ENCOUNTER SYMPTOMS
VOMITING: 0
NAUSEA: 0
SHORTNESS OF BREATH: 0
ABDOMINAL PAIN: 0
BLOOD IN STOOL: 0
COUGH: 0

## 2022-12-14 NOTE — PROGRESS NOTES
[facility]) Physicians   Rheumatology Clinic Note      12/14/2022       CHIEF COMPLAINT:    Chief Complaint   Patient presents with    Follow-up      6 week f/u Systemic lupus erythematosus, unspecified SLE type, unspecified organ involvement status ([facility]           HISTORY OF PRESENT ILLNESS:    33 y.o. female with h/o SLE presents for follow up. Clinical manifestations at presentation in 2016 includes inflammatory arthritis, skin rash, Raynaud's and weight loss. Former pt of Dr. Michael Chacko. When seen last visit, labs were ordered and Plaquenil 200 mg daily was restarted. Reports improvement in her joint pain and stiffness after starting Plaquenil. She is 8 weeks pregnant. Has 9-month old daughter who is healthy. Main concern is fatigue. No skin rash. No oral ulcers. No alopecia. Past Medical History:     has a past medical history of Anemia, Autoimmune disorder ([facility] Diarrhea, Hypertension, Systemic lupus erythematosus ([facility] and Weight loss. Past Surgical History:     has no past surgical history on file. Current Medications:      Current Outpatient Medications:     hydroxychloroquine (PLAQUENIL) 200 MG tablet, Take 1 tablet by mouth daily, Disp: 30 tablet, Rfl: 5    Prenatal Vit-Fe Fumarate-FA (PRENATAL 1+1 PO), Take by mouth, Disp: , Rfl:     Allergies:    Percocet [oxycodone-acetaminophen]    Social History:     reports that she has quit smoking. Her smoking use included cigarettes. She smoked an average of .25 packs per day. She has never used smokeless tobacco. She reports that she does not drink alcohol and does not use drugs. Family History:   family history includes Cancer in her paternal aunt; No Known Problems in her father and mother. REVIEW OF SYSTEMS:    Review of Systems   Constitutional:  Positive for fatigue. Negative for chills and fever. HENT:  Negative for mouth sores. Respiratory:  Negative for cough and shortness of breath.     Cardiovascular:  Negative for chest pain and leg swelling. Gastrointestinal:  Negative for abdominal pain, blood in stool, nausea and vomiting. Skin:  Negative for rash. PHYSICAL EXAM:    Vitals:    /70 (Site: Left Upper Arm, Position: Sitting, Cuff Size: Medium Adult)   Pulse 89   Ht 5' 7.01\" (1.702 m)   Wt 183 lb (83 kg)   SpO2 100%   BMI 28.66 kg/m²     Physical Exam  Constitutional:       General: She is not in acute distress. Appearance: Normal appearance. Eyes:      Conjunctiva/sclera: Conjunctivae normal.   Pulmonary:      Effort: Pulmonary effort is normal.   Musculoskeletal:         General: No swelling or deformity. Cervical back: Neck supple. Right lower leg: No edema. Left lower leg: No edema. Skin:     General: Skin is dry. Findings: No rash. Neurological:      General: No focal deficit present. Mental Status: She is alert and oriented to person, place, and time.       Gait: Gait normal.   Psychiatric:         Mood and Affect: Mood normal.          DATA:             Latest Reference Range & Units 11/2/22 10:51   Sodium 135 - 145 meq/L 141   Potassium 3.5 - 5.2 meq/L 4.2   Chloride 98 - 111 meq/L 105   CO2 23 - 33 meq/L 25   BUN,BUNPL 7 - 22 mg/dL 8   Creatinine 0.4 - 1.2 mg/dL 0.5   Anion Gap 8.0 - 16.0 meq/L 11.0   Est, Glom Filt Rate >60 ml/min/1.73m2 >60   Glucose, Random 70 - 108 mg/dL 87   CALCIUM, SERUM, 075411 8.5 - 10.5 mg/dL 9.5   Total Protein 6.1 - 8.0 g/dL 7.0      Latest Reference Range & Units 11/2/22 10:51   CRP 0.00 - 1.00 mg/dl 0.73      Latest Reference Range & Units 11/2/22 10:51   Albumin 3.5 - 5.1 g/dL 4.5   Alk Phos 38 - 126 U/L 126   ALT 11 - 66 U/L 13   AST 5 - 40 U/L 17   Bilirubin 0.3 - 1.2 mg/dL 0.4   Total Protein 6.1 - 8.0 g/dL 7.0      Latest Reference Range & Units 11/2/22 10:52   WBC 4.8 - 10.8 thou/mm3 6.1   RBC 4.20 - 5.40 mill/mm3 4.98   Hemoglobin Quant 12.0 - 16.0 gm/dl 13.4   Hematocrit 37.0 - 47.0 % 42.8   MCV 81.0 - 99.0 fL 85.9   MCH 26.0 - 33.0 pg 26.9   MCHC 32.2 - 35.5 gm/dl 31.3 (L)   MPV 9.4 - 12.4 fL 10.0   RDW-CV 11.5 - 14.5 % 13.0   RDW-SD 35.0 - 45.0 fL 40.1   Platelet Count 989 - 400 thou/mm3 244   Lymphocytes Absolute 1.0 - 4.8 thou/mm3 1.9   Monocytes Absolute 0.4 - 1.3 thou/mm3 0.3 (L)   Eosinophils Absolute 0.0 - 0.4 thou/mm3 0.1   Basophils Absolute 0.0 - 0.1 thou/mm3 0.0   Seg Neutrophils % 61.4   Segs Absolute 1.8 - 7.7 thou/mm3 3.7      Latest Reference Range & Units 11/2/22 10:51   Sed Rate 0 - 20 mm/hr 16      Latest Reference Range & Units 11/2/22 10:50   C3 Complement 90 - 180 mg/dL 133   Complement C4 10 - 40 mg/dL 28      Latest Reference Range & Units 11/2/22 10:50   Creatinine, Urine mg/dl 132.6   Prot/Creat Ratio, Ur  0.08                 IMPRESSION/RECOMMENDATIONS:      1. SLE: clinically doing well with controlled disease activity  -- continue Plaquenil 200 mg daily  -- has not had eye exam yet to monitor for Plaquenil-eye toxicity.     RTC in 3 months with labs      Orders Placed This Encounter   Procedures    CBC with Auto Differential     Standing Status:   Future     Standing Expiration Date:   12/14/2023    C-Reactive Protein     Standing Status:   Future     Standing Expiration Date:   12/14/2023    Sedimentation Rate     Standing Status:   Future     Standing Expiration Date:   12/14/2023    C3 Complement     Standing Status:   Future     Standing Expiration Date:   12/14/2023    C4 Complement     Standing Status:   Future     Standing Expiration Date:   12/14/2023    Anti-DNA Antibody, Double-Stranded     Standing Status:   Future     Standing Expiration Date:   12/14/2023          Osmin Phan MD    [address]   [address] [facility]. [address] [facility]  [address] [place] [address]

## 2022-12-15 LAB
C. TRACHOMATIS DNA,THIN PREP: NEGATIVE
N. GONORRHOEAE DNA, THIN PREP: NEGATIVE
SOURCE: NORMAL

## 2022-12-17 LAB
APTIMA MEDIA TYPE: NORMAL
T. VAGINALIS SPECIMEN SOURCE: NORMAL
TRICHOMONAS VAGINALIS BY NAA: NEGATIVE

## 2022-12-21 LAB — CYTOLOGY THIN PREP PAP: NORMAL

## 2022-12-29 ENCOUNTER — HOSPITAL ENCOUNTER (EMERGENCY)
Age: 27
Discharge: HOME | End: 2022-12-29
Payer: COMMERCIAL

## 2022-12-29 VITALS
DIASTOLIC BLOOD PRESSURE: 71 MMHG | HEART RATE: 122 BPM | OXYGEN SATURATION: 99 % | SYSTOLIC BLOOD PRESSURE: 118 MMHG | TEMPERATURE: 98 F | RESPIRATION RATE: 18 BRPM

## 2022-12-29 DIAGNOSIS — U07.1 COVID-19: Primary | ICD-10-CM

## 2022-12-29 LAB
FLU A ANTIGEN: NEGATIVE
FLU B ANTIGEN: NEGATIVE
GROUP A STREP CULTURE, REFLEX: NEGATIVE
SARS-COV-2, NAA: DETECTED

## 2022-12-29 PROCEDURE — 87804 INFLUENZA ASSAY W/OPTIC: CPT

## 2022-12-29 PROCEDURE — 87880 STREP A ASSAY W/OPTIC: CPT

## 2022-12-29 PROCEDURE — 99213 OFFICE O/P EST LOW 20 MIN: CPT

## 2022-12-29 PROCEDURE — 87070 CULTURE OTHR SPECIMN AEROBIC: CPT

## 2022-12-29 PROCEDURE — 87635 SARS-COV-2 COVID-19 AMP PRB: CPT

## 2022-12-29 ASSESSMENT — ENCOUNTER SYMPTOMS
CHEST TIGHTNESS: 0
EYE REDNESS: 0
SHORTNESS OF BREATH: 0
NAUSEA: 0
EYE ITCHING: 0
ABDOMINAL PAIN: 0
DIARRHEA: 0
SINUS PRESSURE: 0
SORE THROAT: 0
COUGH: 1
VOMITING: 0

## 2022-12-29 NOTE — ED TRIAGE NOTES
Pt to UC with c/o fever, body aches, chills and cough that started 2 days ago.  Pt is currently 10 weeks pregnant and breastfeeding

## 2022-12-29 NOTE — ED PROVIDER NOTES
[address]       Chief Complaint   Patient presents with    Fever    Generalized Body Aches    Cough       Nurses Notes reviewed and I agree except as noted in the HPI. HISTORY OF PRESENT ILLNESS   Bruce Quick is a 33 y.o. female who presents to the urgent care for evaluation. Fever cough congestion and sore throat. Started 2 days ago. Pregnant 10 weeks. Has a 3year old at home that she is breast feeding. She has been taking Tylenol. The patient/patient representative has no other acute complaints at this time. REVIEW OF SYSTEMS     Review of Systems   Constitutional:  Positive for fever. Negative for chills and fatigue. HENT:  Negative for congestion, ear pain, sinus pressure and sore throat. Eyes:  Negative for redness and itching. Respiratory:  Positive for cough. Negative for chest tightness and shortness of breath. Cardiovascular:  Negative for chest pain. Gastrointestinal:  Negative for abdominal pain, diarrhea, nausea and vomiting. Musculoskeletal:  Positive for myalgias. Skin:  Negative for rash. Allergic/Immunologic: Negative for environmental allergies and food allergies. Neurological:  Negative for headaches. Hematological:  Negative for adenopathy. PAST MEDICAL HISTORY         Diagnosis Date    Anemia     Autoimmune disorder ([facility]     Diarrhea     Hypertension     in last couple weeks    Systemic lupus erythematosus ([facility]     since 2016    Weight loss        SURGICAL HISTORY     Patient  has no past surgical history on file.     CURRENT MEDICATIONS       Discharge Medication List as of 12/29/2022  9:51 AM        CONTINUE these medications which have NOT CHANGED    Details   hydroxychloroquine (PLAQUENIL) 200 MG tablet Take 1 tablet by mouth daily, Disp-30 tablet, R-5Normal      Prenatal Vit-Fe Fumarate-FA (PRENATAL 1+1 PO) Take by mouthHistorical Med             ALLERGIES     Patient is is allergic to percocet [oxycodone-acetaminophen]. FAMILY HISTORY     Patient'sfamily history includes Cancer in her paternal aunt; No Known Problems in her father and mother. SOCIAL HISTORY     Patient  reports that she has quit smoking. Her smoking use included cigarettes. She smoked an average of .25 packs per day. She has never used smokeless tobacco. She reports that she does not drink alcohol and does not use drugs. PHYSICAL EXAM     ED TRIAGE VITALS  BP: 118/71, Temp: 98 °F (36.7 °C), Heart Rate: (!) 122, Resp: 18, SpO2: 99 %  Physical Exam  Vitals and nursing note reviewed. Constitutional:       General: She is not in acute distress. Appearance: Normal appearance. She is well-developed and well-groomed. HENT:      Head: Normocephalic and atraumatic. Right Ear: Tympanic membrane, ear canal and external ear normal.      Left Ear: Tympanic membrane, ear canal and external ear normal.      Nose: Nose normal.      Mouth/Throat:      Lips: Pink. Mouth: Mucous membranes are moist.      Pharynx: Oropharynx is clear. Eyes:      Conjunctiva/sclera: Conjunctivae normal.      Right eye: Right conjunctiva is not injected. Left eye: Left conjunctiva is not injected. Pupils: Pupils are equal.   Cardiovascular:      Rate and Rhythm: Normal rate. Heart sounds: Normal heart sounds. Pulmonary:      Effort: Pulmonary effort is normal. No respiratory distress. Breath sounds: Normal breath sounds. Musculoskeletal:      Cervical back: Normal range of motion. Skin:     General: Skin is warm and dry. Findings: No rash (on exposed surfaces). Neurological:      Mental Status: She is alert and oriented to person, place, and time. Gait: Gait is intact. Psychiatric:         Mood and Affect: Mood normal.         Speech: Speech normal.         Behavior: Behavior is cooperative.        DIAGNOSTIC RESULTS   Labs:  Abnormal Labs Reviewed   COVID-19, RAPID - Abnormal; Notable for the following components:       Result Value    SARS-CoV-2, WALT DETECTED (*)     All other components within normal limits        IMAGING:  No orders to display     URGENT CARE COURSE:     Vitals:    12/29/22 0924   BP: 118/71   Pulse: (!) 122   Resp: 18   Temp: 98 °F (36.7 °C)   SpO2: 99%       Medications - No data to display  PROCEDURES:  FINALIMPRESSION      1. COVID-19        DISPOSITION/PLAN   DISPOSITION Decision To Discharge 12/29/2022 09:45:47 AM        ED Course as of 12/29/22 1239   Thu Dec 29, 2022   0949 GROUP A STREP CULTURE, REFLEX: Negative [HA]   0952 SARS-CoV-2, WALT(!!): DETECTED [HA]      ED Course User Index  REJI Giraldo CNP       Problem List Items Addressed This Visit    None  Visit Diagnoses       COVID-19    -  Primary            Physical assessment findings, diagnostic testing(s) if applicable, and vital signs reviewed with patient/patient representative. Differential diagnosis(s) discussed with patient/patient representative. Prescription medications and/or over-the-counter medications for symptom management discussed. Patient is to follow-up with family care provider in 2-3 days if no improvement. If symptoms should worsen or change, go to the ED. Patient/patient representative is aware of care plan, questions answered, verbalizes understanding and is in agreement. Printed instructions attached to after visit summary. If COVID-19 positive or COVID-19 by PCR is pending at time of discharge patient is to quarantine/isolate according to [facility] guidelines. PATIENT REFERRED TO:  [facility] [place] [address],[address]  [address] [address] [address] [facility] [address] [place] [address]  Schedule an appointment as soon as possible for a visit in 3 days  if you do not have a family provider, If symptoms change/worsen, go to the [address] [place] [address], APRN - CNP    Please note that some or all of this chart was generated using [employer] voice recognition software. Although every effort was made to ensure the accuracy of this automated transcription, some errors in transcription may have occurred.         Lizzette Wong, APRN - CNP  12/29/22 6682

## 2022-12-31 LAB — THROAT/NOSE CULTURE: NORMAL

## 2023-01-17 ENCOUNTER — HOSPITAL ENCOUNTER (EMERGENCY)
Age: 28
Discharge: HOME | End: 2023-01-17
Attending: EMERGENCY MEDICINE
Payer: COMMERCIAL

## 2023-01-17 VITALS
OXYGEN SATURATION: 100 % | HEART RATE: 99 BPM | HEIGHT: 67 IN | RESPIRATION RATE: 16 BRPM | DIASTOLIC BLOOD PRESSURE: 73 MMHG | SYSTOLIC BLOOD PRESSURE: 118 MMHG | BODY MASS INDEX: 25.9 KG/M2 | WEIGHT: 165 LBS | TEMPERATURE: 98.9 F

## 2023-01-17 DIAGNOSIS — Z34.92 NORMAL PREGNANCY, SECOND TRIMESTER: ICD-10-CM

## 2023-01-17 DIAGNOSIS — K52.9 GASTROENTERITIS: Primary | ICD-10-CM

## 2023-01-17 LAB
ALBUMIN SERPL-MCNC: 4.2 G/DL (ref 3.5–5.1)
ALP BLD-CCNC: 80 U/L (ref 38–126)
ALT SERPL-CCNC: 13 U/L (ref 11–66)
ANION GAP SERPL CALCULATED.3IONS-SCNC: 15 MEQ/L (ref 8–16)
AST SERPL-CCNC: 17 U/L (ref 5–40)
BACTERIA: ABNORMAL /HPF
BASOPHILS # BLD: 0.1 %
BASOPHILS ABSOLUTE: 0 THOU/MM3 (ref 0–0.1)
BILIRUB SERPL-MCNC: 0.5 MG/DL (ref 0.3–1.2)
BILIRUBIN URINE: NEGATIVE
BLOOD, URINE: NEGATIVE
BUN BLDV-MCNC: 8 MG/DL (ref 7–22)
CALCIUM SERPL-MCNC: 8.7 MG/DL (ref 8.5–10.5)
CASTS 2: ABNORMAL /LPF
CASTS UA: ABNORMAL /LPF
CHARACTER, URINE: CLEAR
CHLORIDE BLD-SCNC: 103 MEQ/L (ref 98–111)
CO2: 19 MEQ/L (ref 23–33)
COLOR: YELLOW
CREAT SERPL-MCNC: 0.3 MG/DL (ref 0.4–1.2)
CRYSTALS, UA: ABNORMAL
EOSINOPHIL # BLD: 0.3 %
EOSINOPHILS ABSOLUTE: 0 THOU/MM3 (ref 0–0.4)
EPITHELIAL CELLS, UA: ABNORMAL /HPF
ERYTHROCYTE [DISTWIDTH] IN BLOOD BY AUTOMATED COUNT: 12.9 % (ref 11.5–14.5)
ERYTHROCYTE [DISTWIDTH] IN BLOOD BY AUTOMATED COUNT: 38.5 FL (ref 35–45)
GFR SERPL CREATININE-BSD FRML MDRD: > 60 ML/MIN/1.73M2
GLUCOSE BLD-MCNC: 79 MG/DL (ref 70–108)
GLUCOSE URINE: NEGATIVE MG/DL
HCT VFR BLD CALC: 43.8 % (ref 37–47)
HEMOGLOBIN: 14.4 GM/DL (ref 12–16)
IMMATURE GRANS (ABS): 0.04 THOU/MM3 (ref 0–0.07)
IMMATURE GRANULOCYTES: 0.4 %
KETONES, URINE: >= 160
LEUKOCYTE ESTERASE, URINE: ABNORMAL
LIPASE: 21.2 U/L (ref 5.6–51.3)
LYMPHOCYTES # BLD: 10.2 %
LYMPHOCYTES ABSOLUTE: 0.9 THOU/MM3 (ref 1–4.8)
MCH RBC QN AUTO: 27.2 PG (ref 26–33)
MCHC RBC AUTO-ENTMCNC: 32.9 GM/DL (ref 32.2–35.5)
MCV RBC AUTO: 82.8 FL (ref 81–99)
MISCELLANEOUS 2: ABNORMAL
MONOCYTES # BLD: 2.4 %
MONOCYTES ABSOLUTE: 0.2 THOU/MM3 (ref 0.4–1.3)
NITRITE, URINE: NEGATIVE
NUCLEATED RED BLOOD CELLS: 0 /100 WBC
OSMOLALITY CALCULATION: 271.1 MOSMOL/KG (ref 275–300)
PH UA: 5.5 (ref 5–9)
PLATELET # BLD: 181 THOU/MM3 (ref 130–400)
PMV BLD AUTO: 9.3 FL (ref 9.4–12.4)
POTASSIUM SERPL-SCNC: 3.4 MEQ/L (ref 3.5–5.2)
PROTEIN UA: ABNORMAL
RBC # BLD: 5.29 MILL/MM3 (ref 4.2–5.4)
RBC URINE: ABNORMAL /HPF
RENAL EPITHELIAL, UA: ABNORMAL
SEG NEUTROPHILS: 86.6 %
SEGMENTED NEUTROPHILS ABSOLUTE COUNT: 8.1 THOU/MM3 (ref 1.8–7.7)
SODIUM BLD-SCNC: 137 MEQ/L (ref 135–145)
SPECIFIC GRAVITY, URINE: > 1.03 (ref 1–1.03)
TOTAL PROTEIN: 7.1 G/DL (ref 6.1–8)
UROBILINOGEN, URINE: 0.2 EU/DL (ref 0–1)
WBC # BLD: 9.3 THOU/MM3 (ref 4.8–10.8)
WBC UA: ABNORMAL /HPF
YEAST: ABNORMAL

## 2023-01-17 PROCEDURE — 96374 THER/PROPH/DIAG INJ IV PUSH: CPT

## 2023-01-17 PROCEDURE — 80053 COMPREHEN METABOLIC PANEL: CPT

## 2023-01-17 PROCEDURE — 36415 COLL VENOUS BLD VENIPUNCTURE: CPT

## 2023-01-17 PROCEDURE — 99284 EMERGENCY DEPT VISIT MOD MDM: CPT

## 2023-01-17 PROCEDURE — 83690 ASSAY OF LIPASE: CPT

## 2023-01-17 PROCEDURE — 81001 URINALYSIS AUTO W/SCOPE: CPT

## 2023-01-17 PROCEDURE — 6360000002 HC RX W HCPCS

## 2023-01-17 PROCEDURE — 2580000003 HC RX 258: Performed by: EMERGENCY MEDICINE

## 2023-01-17 PROCEDURE — 85025 COMPLETE CBC W/AUTO DIFF WBC: CPT

## 2023-01-17 RX ORDER — METOCLOPRAMIDE 10 MG/1
10 TABLET ORAL 4 TIMES DAILY
Qty: 30 TABLET | Refills: 0 | Status: SHIPPED | OUTPATIENT
Start: 2023-01-17 | End: 2023-01-27

## 2023-01-17 RX ORDER — 0.9 % SODIUM CHLORIDE 0.9 %
2000 INTRAVENOUS SOLUTION INTRAVENOUS ONCE
Status: COMPLETED | OUTPATIENT
Start: 2023-01-17 | End: 2023-01-17

## 2023-01-17 RX ORDER — ONDANSETRON 2 MG/ML
INJECTION INTRAMUSCULAR; INTRAVENOUS
Status: COMPLETED
Start: 2023-01-17 | End: 2023-01-17

## 2023-01-17 RX ORDER — ONDANSETRON 2 MG/ML
4 INJECTION INTRAMUSCULAR; INTRAVENOUS ONCE
Status: COMPLETED | OUTPATIENT
Start: 2023-01-17 | End: 2023-01-17

## 2023-01-17 RX ADMIN — ONDANSETRON 4 MG: 2 INJECTION INTRAMUSCULAR; INTRAVENOUS at 18:17

## 2023-01-17 RX ADMIN — SODIUM CHLORIDE 2000 ML: 9 INJECTION, SOLUTION INTRAVENOUS at 18:17

## 2023-01-17 ASSESSMENT — PAIN - FUNCTIONAL ASSESSMENT: PAIN_FUNCTIONAL_ASSESSMENT: NONE - DENIES PAIN

## 2023-01-17 NOTE — ED NOTES
Patient after uncontrolled vomiting since 8am. Patient 13 weeks pregnant      Rey Ferris RN  01/17/23 1076

## 2023-01-17 NOTE — ED PROVIDER NOTES
[address] ENCOUNTER        PATIENT NAME: Avril Posey  MRN: [id number]  : 1995  RAMACHANDRAN: 2023  PROVIDER: Hyman Spatz, MD    CHIEF COMPLAINT       Chief Complaint   Patient presents with    Emesis During Pregnancy     13 weeks pregnant        Patient is seen and evaluated in a timely fashion. Nurses Notes are reviewed and I agree except as noted in the HPI. HISTORY OF PRESENT ILLNESS   Avril Posey is a 33 y.o. female who presents to Emergency Department with Emesis During Pregnancy (13 weeks pregnant )      at 13 weeks gestation presents for evaluation of nausea and vomiting x multiple times today. No fever or chills. No chest pain. No diarrhea. No urinary symptoms. Mild periumbilical abdominal cramping, no vaginal discharge or bleeding, patient is concerned she may be dehydrated. She took Zofran with little help today. OB is Dr. Ronda Ross. This HPI was provided by patient. REVIEW OF SYSTEMS   Ten-point review of systems is negative except those documented in above HPI including constitutional, HEENT, respiratory, cardiovascular, gastrointestinal, genitourinary, musculoskeletal, skin, neurological, hematological and behavioral     PASTMEDICAL HISTORY    has a past medical history of Anemia, Autoimmune disorder ([address] Diarrhea, Hypertension, Systemic lupus erythematosus ([address] and Weight loss. SURGICAL HISTORY      has no past surgical history on file. CURRENT MEDICATIONS       Previous Medications    HYDROXYCHLOROQUINE (PLAQUENIL) 200 MG TABLET    Take 1 tablet by mouth daily    PRENATAL VIT-FE FUMARATE-FA (PRENATAL 1+1 PO)    Take by mouth       ALLERGIES     is allergic to percocet [oxycodone-acetaminophen]. FAMILY HISTORY     She indicated that her mother is alive. She indicated that her father is alive. She indicated that the status of her paternal aunt is unknown.   family history includes Cancer in her paternal aunt;  No Known Problems in her father and mother.    SOCIAL HISTORY      reports that she has quit smoking. Her smoking use included cigarettes. She smoked an average of .25 packs per day. She has never used smokeless tobacco. She reports that she does not drink alcohol and does not use drugs.    PHYSICAL EXAM      height is 5' 7\" (1.702 m) and weight is 165 lb (74.8 kg). Her oral temperature is 98.9 °F (37.2 °C). Her blood pressure is 118/73 and her pulse is 99. Her respiration is 16 and oxygen saturation is 100%.   Physical Exam  Vitals and nursing note reviewed.   Constitutional:       Appearance: She is well-developed. She is not diaphoretic.   HENT:      Head: Normocephalic and atraumatic.      Nose: Nose normal.   Eyes:      General: No scleral icterus.        Right eye: No discharge.         Left eye: No discharge.      Conjunctiva/sclera: Conjunctivae normal.      Pupils: Pupils are equal, round, and reactive to light.   Neck:      Vascular: No JVD.      Trachea: No tracheal deviation.   Cardiovascular:      Rate and Rhythm: Normal rate and regular rhythm.      Heart sounds: Normal heart sounds. No murmur heard.    No friction rub. No gallop.   Pulmonary:      Effort: Pulmonary effort is normal. No respiratory distress.      Breath sounds: Normal breath sounds. No stridor. No wheezing or rales.   Chest:      Chest wall: No tenderness.   Abdominal:      General: Bowel sounds are normal. There is no distension.      Palpations: Abdomen is soft. There is no mass.      Tenderness: There is no abdominal tenderness. There is no guarding or rebound.      Hernia: No hernia is present.   Musculoskeletal:         General: No tenderness or deformity.      Cervical back: Normal range of motion and neck supple.   Lymphadenopathy:      Cervical: No cervical adenopathy.   Skin:     General: Skin is warm and dry.      Capillary Refill: Capillary refill takes less than 2 seconds.      Coloration: Skin is not pale.      Findings: No  erythema or rash. Neurological:      Mental Status: She is alert and oriented to person, place, and time. Cranial Nerves: No cranial nerve deficit. Sensory: No sensory deficit. Motor: No abnormal muscle tone. Coordination: Coordination normal.      Deep Tendon Reflexes: Reflexes normal.   Psychiatric:         Behavior: Behavior normal.         Thought Content: Thought content normal.         Judgment: Judgment normal.       FORMAL DIAGNOSTIC RESULTS     RADIOLOGY: Interpretation per the Radiologist below, if available at the time of this note (none if blank): No orders to display       LABS: (none if blank)  Results for orders placed or performed during the hospital encounter of 01/17/23   CBC with Auto Differential   Result Value Ref Range    WBC 9.3 4.8 - 10.8 thou/mm3    RBC 5.29 4. 20 - 5.40 mill/mm3    Hemoglobin 14.4 12.0 - 16.0 gm/dl    Hematocrit 43.8 37.0 - 47.0 %    MCV 82.8 81.0 - 99.0 fL    MCH 27.2 26.0 - 33.0 pg    MCHC 32.9 32.2 - 35.5 gm/dl    RDW-CV 12.9 11.5 - 14.5 %    RDW-SD 38.5 35.0 - 45.0 fL    Platelets 394 415 - 439 thou/mm3    MPV 9.3 (L) 9.4 - 12.4 fL    Seg Neutrophils 86.6 %    Lymphocytes 10.2 %    Monocytes 2.4 %    Eosinophils 0.3 %    Basophils 0.1 %    Immature Granulocytes 0.4 %    Segs Absolute 8.1 (H) 1.8 - 7.7 thou/mm3    Lymphocytes Absolute 0.9 (L) 1.0 - 4.8 thou/mm3    Monocytes Absolute 0.2 (L) 0.4 - 1.3 thou/mm3    Eosinophils Absolute 0.0 0.0 - 0.4 thou/mm3    Basophils Absolute 0.0 0.0 - 0.1 thou/mm3    Immature Grans (Abs) 0.04 0.00 - 0.07 thou/mm3    nRBC 0 /100 wbc   Comprehensive Metabolic Panel   Result Value Ref Range    Glucose 79 70 - 108 mg/dL    Creatinine 0.3 (L) 0.4 - 1.2 mg/dL    BUN 8 7 - 22 mg/dL    Sodium 137 135 - 145 meq/L    Potassium 3.4 (L) 3.5 - 5.2 meq/L    Chloride 103 98 - 111 meq/L    CO2 19 (L) 23 - 33 meq/L    Calcium 8.7 8.5 - 10.5 mg/dL    AST 17 5 - 40 U/L    Alkaline Phosphatase 80 38 - 126 U/L    Total Protein 7.1 6.1 - 8.0 g/dL    Albumin 4.2 3.5 - 5.1 g/dL    Total Bilirubin 0.5 0.3 - 1.2 mg/dL    ALT 13 11 - 66 U/L   Lipase   Result Value Ref Range    Lipase 21.2 5.6 - 51.3 U/L   Anion Gap   Result Value Ref Range    Anion Gap 15.0 8.0 - 16.0 meq/L   Osmolality   Result Value Ref Range    Osmolality Calc 271.1 (L) 275.0 - 300.0 mOsmol/kg   Glomerular Filtration Rate, Estimated   Result Value Ref Range    Est, Glom Filt Rate >60 >60 ml/min/1.73m2   Urine with Reflexed Micro   Result Value Ref Range    Glucose, Ur NEGATIVE NEGATIVE mg/dl    Bilirubin Urine NEGATIVE NEGATIVE    Ketones, Urine >= 160 (A) NEGATIVE    Specific Gravity, Urine > 1.030 (A) 1.002 - 1.030    Blood, Urine NEGATIVE NEGATIVE    pH, UA 5.5 5.0 - 9.0    Protein, UA TRACE (A) NEGATIVE    Urobilinogen, Urine 0.2 0.0 - 1.0 eu/dl    Nitrite, Urine NEGATIVE NEGATIVE    Leukocyte Esterase, Urine TRACE (A) NEGATIVE    Color, UA YELLOW STRAW-YELLOW    Character, Urine CLEAR CLEAR-SL CLOUD    RBC, UA 0-2 0-2/hpf /hpf    WBC, UA 2-4 0-4/hpf /hpf    Epithelial Cells, UA 0-2 3-5/hpf /hpf    Bacteria, UA NONE SEEN FEW/NONE SEEN /hpf    Casts UA NONE SEEN NONE SEEN /lpf    Crystals, UA NONE SEEN NONE SEEN    Renal Epithelial, UA NONE SEEN NONE SEEN    Yeast, UA NONE SEEN NONE SEEN    CASTS 2 NONE SEEN NONE SEEN /lpf    MISCELLANEOUS 2 NONE SEEN        (Any cultures that may have been sent were not resulted at the time of this patient visit)    [facility] ([facility]) and ED COURSE:     [facility] Summary:     Patient presents with nausea vomiting during second trimester. Rapid onset. Patient states she has been doing fine without significant morning sickness during first 12 weeks. Vital signs are stable. Patient's history does not suggest this is hyperemesis gravidarum, I suspect this is viral illness. Labs are reassuring. UA has no UTI. Fetal heart tones 171/minute. Patient actually developes diarrhea in ED.   Nausea and vomiting are well controlled in the ED with IV Zofran and saline bolus. Discharged with Reglan added for treatment. OB/GYN follow-up as scheduled. ED Course as of 01/17/23 2106 Tue Jan 17, 2023   1819 Pt is seen and evaluated. [ELOISE]   1906 POCUS ultrasound shows live IUP, fetal heart rate 171/min [ELOISE]   2102 Labs are reviewed which are reassuring. [ELOISE]      ED Course User Index  [ELOISE] Monse Rosales MD       Vitals:    01/17/23 1630 01/17/23 1806 01/17/23 1821   BP: 113/63 113/76 118/73   Pulse:  99    Resp: 20 16    Temp: 98.9 °F (37.2 °C)     TempSrc: Oral     SpO2: 98% 100%    Weight: 165 lb (74.8 kg)     Height: 5' 7\" (1.702 m)         1) Number and Complexity of Problems        Problem List This Visit:   Emesis During Pregnancy (13 weeks pregnant )      Differential Diagnosis includes (but not limited to):   Viral gastroenteritis, dehydration, electrolyte imbalance, UTI    Diagnoses Considered but I have low suspicion of:   Hyperemesis gravidarum, miscarriage        Pertinent Comorbid Conditions:    See HPI, PMH and PSH    2)  Data Reviewed (none if left blank)    My Independent interpretations:       EKG:   Not indicated    External Documentation Reviewed:   Care everywhere in [facility] is reviewed. Previous patient encounter documents & history available on EMR was reviewed:   Yes    See Formal Diagnostic Results above for the lab and radiology tests and orders.     3)  Treatment and Disposition    ED Reassessment:  Stable ED stay    Case discussed with consulting clinician:    None    Shared Decision-Making:   Treatment plan and disposition discussed with the patient/family, questions answered     Code Status:    Reviewed with patient and/or family as full code    ED Medications administered this visit:  (None if blank)  Medications   ondansetron (ZOFRAN) injection 4 mg (4 mg IntraVENous Given 1/17/23 1817)   0.9 % sodium chloride bolus (2,000 mLs IntraVENous New Bag 1/17/23 1817)       PROCEDURES:   None    CRITICAL CARE:   None    FINAL IMPRESSION 1. Gastroenteritis    2.  Normal pregnancy, second trimester          DISPOSITION/PLAN   DISPOSITION Decision To Discharge 01/17/2023 09:03:45 PM      OUTPATIENT FOLLOW UP THE PATIENT:  Phyllis Rubi MD  1 [facility] [phone]      As scheduled  DISCHARGE MEDICATIONS:(None if blank)  New Prescriptions    METOCLOPRAMIDE (REGLAN) 10 MG TABLET    Take 1 tablet by mouth 4 times daily for 10 days         MD Sami Rossi MD  01/17/23 1678

## 2023-01-18 NOTE — ED NOTES
Pt had diarrhea and needed a change of clothes. Pt was given a change of clothes and cleaned herself up. Bag for soiled clothing given.       Yoni Tierney RN  01/17/23 1948

## 2023-01-18 NOTE — ED NOTES
ED nurse-to-nurse bedside report    Chief Complaint   Patient presents with    Emesis During Pregnancy     13 weeks pregnant       LOC: alert and orientated to name, place, date  Vital signs   Vitals:    01/17/23 1630 01/17/23 1806 01/17/23 1821   BP: 113/63 113/76 118/73   Pulse:  99    Resp: 20 16    Temp: 98.9 °F (37.2 °C)     TempSrc: Oral     SpO2: 98% 100%    Weight: 165 lb (74.8 kg)     Height: 5' 7\" (1.702 m)        Pain:    Pain Interventions: 0  Pain Goal: 0  Oxygen: No    Current needs required RA   Telemetry: No  LDAs:   Peripheral IV 01/17/23 Right Hand (Active)     Continuous Infusions:   Mobility: Independent  Casper Fall Risk Score: No flowsheet data found.   Fall Interventions: call light in reach, bed in low position with wheels locked  Report given to: Umair Khan RN  01/17/23 José Davis

## 2023-01-18 NOTE — ED NOTES
Pt given ice chips and was able to keep that down. Pt using call light to say she is still nauseated. ED provider informed.       Bridget Latif RN  01/17/23 2051

## 2023-03-01 ENCOUNTER — NURSE ONLY (OUTPATIENT)
Dept: LAB | Age: 28
End: 2023-03-01

## 2023-03-01 DIAGNOSIS — M32.9 SYSTEMIC LUPUS ERYTHEMATOSUS, UNSPECIFIED SLE TYPE, UNSPECIFIED ORGAN INVOLVEMENT STATUS (HCC): ICD-10-CM

## 2023-03-01 LAB
BASOPHILS ABSOLUTE: 0 THOU/MM3 (ref 0–0.1)
BASOPHILS NFR BLD AUTO: 0.2 %
CRP SERPL-MCNC: 1.32 MG/DL (ref 0–1)
DEPRECATED RDW RBC AUTO: 40.9 FL (ref 35–45)
EOSINOPHIL NFR BLD AUTO: 0.5 %
EOSINOPHILS ABSOLUTE: 0 THOU/MM3 (ref 0–0.4)
ERYTHROCYTE [DISTWIDTH] IN BLOOD BY AUTOMATED COUNT: 13.5 % (ref 11.5–14.5)
ERYTHROCYTE [SEDIMENTATION RATE] IN BLOOD BY WESTERGREN METHOD: 98 MM/HR (ref 0–20)
HCT VFR BLD AUTO: 40.2 % (ref 37–47)
HGB BLD-MCNC: 13.2 GM/DL (ref 12–16)
IMM GRANULOCYTES # BLD AUTO: 0.03 THOU/MM3 (ref 0–0.07)
IMM GRANULOCYTES NFR BLD AUTO: 0.4 %
LYMPHOCYTES ABSOLUTE: 2.3 THOU/MM3 (ref 1–4.8)
LYMPHOCYTES NFR BLD AUTO: 28.3 %
MCH RBC QN AUTO: 27.3 PG (ref 26–33)
MCHC RBC AUTO-ENTMCNC: 32.8 GM/DL (ref 32.2–35.5)
MCV RBC AUTO: 83.2 FL (ref 81–99)
MONOCYTES ABSOLUTE: 0.3 THOU/MM3 (ref 0.4–1.3)
MONOCYTES NFR BLD AUTO: 3.6 %
NEUTROPHILS NFR BLD AUTO: 67 %
NRBC BLD AUTO-RTO: 0 /100 WBC
PLATELET # BLD AUTO: 236 THOU/MM3 (ref 130–400)
PMV BLD AUTO: 9.8 FL (ref 9.4–12.4)
RBC # BLD AUTO: 4.83 MILL/MM3 (ref 4.2–5.4)
SEGMENTED NEUTROPHILS ABSOLUTE COUNT: 5.4 THOU/MM3 (ref 1.8–7.7)
WBC # BLD AUTO: 8.1 THOU/MM3 (ref 4.8–10.8)

## 2023-03-02 ENCOUNTER — HOSPITAL ENCOUNTER (OUTPATIENT)
Dept: ULTRASOUND IMAGING | Age: 28
Discharge: HOME | End: 2023-03-02
Payer: COMMERCIAL

## 2023-03-02 DIAGNOSIS — M32.9 SYSTEMIC LUPUS ERYTHEMATOSUS AFFECTING PREGNANCY IN SECOND TRIMESTER (HCC): ICD-10-CM

## 2023-03-02 DIAGNOSIS — O99.891 SYSTEMIC LUPUS ERYTHEMATOSUS AFFECTING PREGNANCY IN SECOND TRIMESTER (HCC): ICD-10-CM

## 2023-03-02 PROCEDURE — 99211 OFF/OP EST MAY X REQ PHY/QHP: CPT

## 2023-03-02 PROCEDURE — 76811 OB US DETAILED SNGL FETUS: CPT

## 2023-03-02 NOTE — PROGRESS NOTES
HT: 5' 7\"   WT:  167.2 Lbs. 76 Kg. BMI- 26.2  T:   96.6  Skin  BP: 104/56  P: 86  R: 16  No problems/concerns. No CoVid signs/symptoms.

## 2023-03-02 NOTE — PLAN OF CARE
Provider discussed disease process, treatment plan, medications,and discharge instructions.  Patient agrees with plan.  Any questions were answered.  Care plan reviewed with patient.

## 2023-03-03 LAB
C3C SERPL-MCNC: 169 MG/DL (ref 90–180)
C4 SERPL-MCNC: 27 MG/DL (ref 10–40)

## 2023-03-04 LAB — DSDNA AB TITR SER CLIF: NORMAL {TITER}

## 2023-03-14 ENCOUNTER — OFFICE VISIT (OUTPATIENT)
Dept: RHEUMATOLOGY | Age: 28
End: 2023-03-14
Payer: COMMERCIAL

## 2023-03-14 VITALS
BODY MASS INDEX: 25.9 KG/M2 | SYSTOLIC BLOOD PRESSURE: 122 MMHG | HEIGHT: 67 IN | DIASTOLIC BLOOD PRESSURE: 78 MMHG | WEIGHT: 165 LBS | HEART RATE: 89 BPM | OXYGEN SATURATION: 100 %

## 2023-03-14 DIAGNOSIS — R70.0 ESR RAISED: ICD-10-CM

## 2023-03-14 DIAGNOSIS — M32.9 SYSTEMIC LUPUS ERYTHEMATOSUS, UNSPECIFIED SLE TYPE, UNSPECIFIED ORGAN INVOLVEMENT STATUS (HCC): Primary | ICD-10-CM

## 2023-03-14 PROCEDURE — G8417 CALC BMI ABV UP PARAM F/U: HCPCS | Performed by: INTERNAL MEDICINE

## 2023-03-14 PROCEDURE — 1036F TOBACCO NON-USER: CPT | Performed by: INTERNAL MEDICINE

## 2023-03-14 PROCEDURE — 99214 OFFICE O/P EST MOD 30 MIN: CPT | Performed by: INTERNAL MEDICINE

## 2023-03-14 PROCEDURE — G8484 FLU IMMUNIZE NO ADMIN: HCPCS | Performed by: INTERNAL MEDICINE

## 2023-03-14 PROCEDURE — G8427 DOCREV CUR MEDS BY ELIG CLIN: HCPCS | Performed by: INTERNAL MEDICINE

## 2023-03-14 ASSESSMENT — ENCOUNTER SYMPTOMS
COUGH: 0
VOMITING: 0
BLOOD IN STOOL: 0
NAUSEA: 0
ABDOMINAL PAIN: 0
SHORTNESS OF BREATH: 0

## 2023-03-14 NOTE — PROGRESS NOTES
[facility]) Physicians   Rheumatology Clinic Note      3/14/2023       CHIEF COMPLAINT:    Chief Complaint   Patient presents with    Follow-up     3 mnth f/u  Pt stated no pain            HISTORY OF PRESENT ILLNESS:    33 y.o. female with h/o SLE presents for follow up. Clinical manifestations at presentation in 2016 includes inflammatory arthritis, skin rash, Raynaud's and weight loss. Former pt of Dr. Alissa Neal. Presently, she is on Plaquenil 200 mg daily. Reports improvement in her joint pain and stiffness after starting Plaquenil. She is 21 weeks pregnant. Due July 24th. Main concern is fatigue. No skin rash. No oral ulcers. No alopecia. Past Medical History:     has a past medical history of Anemia, Autoimmune disorder ([facility] [address] Diarrhea, Hypertension, Systemic lupus erythematosus ([facility] [address] and Weight loss. Past Surgical History:     has no past surgical history on file. Current Medications:      Current Outpatient Medications:     Prenatal Vit-Fe Fumarate-FA (PRENATAL 1+1 PO), Take by mouth, Disp: , Rfl:     metoclopramide (REGLAN) 10 MG tablet, Take 1 tablet by mouth 4 times daily for 10 days, Disp: 30 tablet, Rfl: 0    hydroxychloroquine (PLAQUENIL) 200 MG tablet, Take 1 tablet by mouth daily, Disp: 30 tablet, Rfl: 5    Allergies:    Percocet [oxycodone-acetaminophen]    Social History:     reports that she has quit smoking. Her smoking use included cigarettes. She smoked an average of .25 packs per day. She has never used smokeless tobacco. She reports that she does not drink alcohol and does not use drugs. Family History:   family history includes Cancer in her paternal aunt; No Known Problems in her father and mother. REVIEW OF SYSTEMS:    Review of Systems   Constitutional:  Positive for fatigue. Negative for chills and fever. HENT:  Negative for mouth sores. Respiratory:  Negative for cough and shortness of breath.     Cardiovascular:  Negative for chest pain and leg swelling. Gastrointestinal:  Negative for abdominal pain, blood in stool, nausea and vomiting. Skin:  Negative for rash. PHYSICAL EXAM:    Vitals:    /78 (Site: Left Upper Arm, Position: Sitting, Cuff Size: Large Adult)   Pulse 89   Ht 5' 7\" (1.702 m)   Wt 165 lb (74.8 kg)   SpO2 100%   BMI 25.84 kg/m²     Physical Exam  Constitutional:       General: She is not in acute distress. Appearance: Normal appearance. Eyes:      Conjunctiva/sclera: Conjunctivae normal.   Pulmonary:      Effort: Pulmonary effort is normal.   Musculoskeletal:         General: No swelling or deformity. Cervical back: Neck supple. Right lower leg: No edema. Left lower leg: No edema. Skin:     General: Skin is dry. Findings: No rash. Neurological:      General: No focal deficit present. Mental Status: She is alert and oriented to person, place, and time.       Gait: Gait normal.   Psychiatric:         Mood and Affect: Mood normal.          DATA:             Latest Reference Range & Units 11/2/22 10:51   Sodium 135 - 145 meq/L 141   Potassium 3.5 - 5.2 meq/L 4.2   Chloride 98 - 111 meq/L 105   CO2 23 - 33 meq/L 25   BUN,BUNPL 7 - 22 mg/dL 8   Creatinine 0.4 - 1.2 mg/dL 0.5   Anion Gap 8.0 - 16.0 meq/L 11.0   Est, Glom Filt Rate >60 ml/min/1.73m2 >60   Glucose, Random 70 - 108 mg/dL 87   CALCIUM, SERUM, 215727 8.5 - 10.5 mg/dL 9.5   Total Protein 6.1 - 8.0 g/dL 7.0      Latest Reference Range & Units 11/2/22 10:51   CRP 0.00 - 1.00 mg/dl 0.73      Latest Reference Range & Units 11/2/22 10:51   Albumin 3.5 - 5.1 g/dL 4.5   Alk Phos 38 - 126 U/L 126   ALT 11 - 66 U/L 13   AST 5 - 40 U/L 17   Bilirubin 0.3 - 1.2 mg/dL 0.4   Total Protein 6.1 - 8.0 g/dL 7.0      Latest Reference Range & Units 11/2/22 10:52   WBC 4.8 - 10.8 thou/mm3 6.1   RBC 4.20 - 5.40 mill/mm3 4.98   Hemoglobin Quant 12.0 - 16.0 gm/dl 13.4   Hematocrit 37.0 - 47.0 % 42.8   MCV 81.0 - 99.0 fL 85.9   MCH 26.0 - 33.0 pg 26.9 MCHC 32.2 - 35.5 gm/dl 31.3 (L)   MPV 9.4 - 12.4 fL 10.0   RDW-CV 11.5 - 14.5 % 13.0   RDW-SD 35.0 - 45.0 fL 40.1   Platelet Count 805 - 400 thou/mm3 244   Lymphocytes Absolute 1.0 - 4.8 thou/mm3 1.9   Monocytes Absolute 0.4 - 1.3 thou/mm3 0.3 (L)   Eosinophils Absolute 0.0 - 0.4 thou/mm3 0.1   Basophils Absolute 0.0 - 0.1 thou/mm3 0.0   Seg Neutrophils % 61.4   Segs Absolute 1.8 - 7.7 thou/mm3 3.7      Latest Reference Range & Units 11/2/22 10:51   Sed Rate 0 - 20 mm/hr 16      Latest Reference Range & Units 11/2/22 10:50   C3 Complement 90 - 180 mg/dL 133   Complement C4 10 - 40 mg/dL 28      Latest Reference Range & Units 11/2/22 10:50   Creatinine, Urine mg/dl 132.6   Prot/Creat Ratio, Ur  0.08           RAPID3 Composite Score = MDHAQ (0-10) + Patient pain VAS (0-10): + Patient global assessment VAS (0-10):     3/14/2023 --- RAPID 3: 0 + 0 + 0 = 0     Remission: <3  Low Disease Activity: <6  Moderate Disease Activity: >=6 and <=12  High Disease Activity: >12        IMPRESSION/RECOMMENDATIONS:      1. SLE: clinically doing well with controlled disease activity. However, noted pt having very high ESR level. Not clear the etiology is.   -- will repeat labs today, including ESR, CBC  -- continue Plaquenil 200 mg daily      RTC in 3 months with labs      Orders Placed This Encounter   Procedures    Protein / creatinine ratio, urine     Standing Status:   Future     Standing Expiration Date:   9/14/2023    Urinalysis     Standing Status:   Future     Standing Expiration Date:   9/14/2023    Sedimentation Rate     Standing Status:   Future     Standing Expiration Date:   9/14/2023    CBC with Auto Differential     Standing Status:   Future     Standing Expiration Date:   3/14/2024    Comprehensive Metabolic Panel     Standing Status:   Future     Standing Expiration Date:   3/14/2024    C-Reactive Protein     Standing Status:   Future     Standing Expiration Date:   3/14/2024    Sedimentation Rate     Standing Status: Future     Standing Expiration Date:   3/14/2024    C3 Complement     Standing Status:   Future     Standing Expiration Date:   9/14/2023    C4 Complement     Standing Status:   Future     Standing Expiration Date:   9/14/2023    Protein / creatinine ratio, urine     Standing Status:   Future     Standing Expiration Date:   9/14/2023    Urinalysis     Standing Status:   Future     Standing Expiration Date:   9/14/2023    Anti-DNA Antibody, Double-Stranded     Standing Status:   Future     Standing Expiration Date:   9/14/2023            Osmin Phan MD    [address] [place] [address]  [address] [facility]. [address]  [phone] forehead

## 2023-04-06 ENCOUNTER — HOSPITAL ENCOUNTER (OUTPATIENT)
Dept: ULTRASOUND IMAGING | Age: 28
Discharge: HOME | End: 2023-04-06
Payer: COMMERCIAL

## 2023-04-06 DIAGNOSIS — M32.9 SYSTEMIC LUPUS ERYTHEMATOSUS, UNSPECIFIED SLE TYPE, UNSPECIFIED ORGAN INVOLVEMENT STATUS (HCC): ICD-10-CM

## 2023-04-06 DIAGNOSIS — O36.5920 POOR FETAL GROWTH AFFECTING MANAGEMENT OF MOTHER IN SECOND TRIMESTER, SINGLE OR UNSPECIFIED FETUS: ICD-10-CM

## 2023-04-06 PROCEDURE — 76816 OB US FOLLOW-UP PER FETUS: CPT

## 2023-04-06 PROCEDURE — 76820 UMBILICAL ARTERY ECHO: CPT

## 2023-04-06 PROCEDURE — 99211 OFF/OP EST MAY X REQ PHY/QHP: CPT

## 2023-04-06 NOTE — PROGRESS NOTES
HT:  5' 7\"  WT:   175.4  Lbs. 79.7    Kg. BMI- 27.5  T:    97.3 Skin  BP: 109/54  P: 97  R: 20  No problems/concerns. No CoVid signs/symptoms.

## 2023-04-30 ENCOUNTER — HOSPITAL ENCOUNTER (OUTPATIENT)
Age: 28
Discharge: HOME | End: 2023-04-30
Attending: OBSTETRICS & GYNECOLOGY | Admitting: OBSTETRICS & GYNECOLOGY
Payer: COMMERCIAL

## 2023-04-30 VITALS
TEMPERATURE: 97.5 F | OXYGEN SATURATION: 99 % | RESPIRATION RATE: 16 BRPM | SYSTOLIC BLOOD PRESSURE: 129 MMHG | DIASTOLIC BLOOD PRESSURE: 68 MMHG | HEIGHT: 67 IN | WEIGHT: 178 LBS | BODY MASS INDEX: 27.94 KG/M2 | HEART RATE: 111 BPM

## 2023-04-30 PROBLEM — R11.2 NAUSEA & VOMITING: Status: ACTIVE | Noted: 2023-04-30

## 2023-04-30 LAB
ALBUMIN SERPL BCG-MCNC: 3.7 G/DL (ref 3.5–5.1)
ALP SERPL-CCNC: 119 U/L (ref 38–126)
ALT SERPL W/O P-5'-P-CCNC: 25 U/L (ref 11–66)
ANION GAP SERPL CALC-SCNC: 13 MEQ/L (ref 8–16)
AST SERPL-CCNC: 20 U/L (ref 5–40)
BILIRUB SERPL-MCNC: 0.3 MG/DL (ref 0.3–1.2)
BILIRUB UR QL STRIP.AUTO: NEGATIVE
BUN SERPL-MCNC: 7 MG/DL (ref 7–22)
CALCIUM SERPL-MCNC: 8.3 MG/DL (ref 8.5–10.5)
CHARACTER UR: CLEAR
CHLORIDE SERPL-SCNC: 105 MEQ/L (ref 98–111)
CO2 SERPL-SCNC: 17 MEQ/L (ref 23–33)
COLOR: YELLOW
CREAT SERPL-MCNC: 0.3 MG/DL (ref 0.4–1.2)
DEPRECATED RDW RBC AUTO: 39 FL (ref 35–45)
ERYTHROCYTE [DISTWIDTH] IN BLOOD BY AUTOMATED COUNT: 13 % (ref 11.5–14.5)
GFR SERPL CREATININE-BSD FRML MDRD: > 60 ML/MIN/1.73M2
GLUCOSE SERPL-MCNC: 90 MG/DL (ref 70–108)
GLUCOSE UR QL STRIP.AUTO: NEGATIVE MG/DL
HCT VFR BLD AUTO: 38.7 % (ref 37–47)
HGB BLD-MCNC: 12.2 GM/DL (ref 12–16)
HGB UR QL STRIP.AUTO: NEGATIVE
KETONES UR QL STRIP.AUTO: 15
MCH RBC QN AUTO: 26.2 PG (ref 26–33)
MCHC RBC AUTO-ENTMCNC: 31.5 GM/DL (ref 32.2–35.5)
MCV RBC AUTO: 83 FL (ref 81–99)
NITRITE UR QL STRIP: NEGATIVE
PH UR STRIP.AUTO: 6.5 [PH] (ref 5–9)
PLATELET # BLD AUTO: 222 THOU/MM3 (ref 130–400)
PMV BLD AUTO: 9.7 FL (ref 9.4–12.4)
POTASSIUM SERPL-SCNC: 3.7 MEQ/L (ref 3.5–5.2)
PROT SERPL-MCNC: 6.8 G/DL (ref 6.1–8)
PROT UR STRIP.AUTO-MCNC: NEGATIVE MG/DL
RBC # BLD AUTO: 4.66 MILL/MM3 (ref 4.2–5.4)
SODIUM SERPL-SCNC: 135 MEQ/L (ref 135–145)
SP GR UR REFRACT.AUTO: 1.02 (ref 1–1.03)
UROBILINOGEN, URINE: 1 EU/DL (ref 0–1)
WBC # BLD AUTO: 14.2 THOU/MM3 (ref 4.8–10.8)
WBC #/AREA URNS HPF: NEGATIVE /[HPF]

## 2023-04-30 PROCEDURE — 81003 URINALYSIS AUTO W/O SCOPE: CPT

## 2023-04-30 PROCEDURE — 6360000002 HC RX W HCPCS: Performed by: OBSTETRICS & GYNECOLOGY

## 2023-04-30 PROCEDURE — 96374 THER/PROPH/DIAG INJ IV PUSH: CPT

## 2023-04-30 PROCEDURE — 2580000003 HC RX 258: Performed by: OBSTETRICS & GYNECOLOGY

## 2023-04-30 PROCEDURE — 85027 COMPLETE CBC AUTOMATED: CPT

## 2023-04-30 PROCEDURE — 80053 COMPREHEN METABOLIC PANEL: CPT

## 2023-04-30 PROCEDURE — 96360 HYDRATION IV INFUSION INIT: CPT

## 2023-04-30 PROCEDURE — 96361 HYDRATE IV INFUSION ADD-ON: CPT

## 2023-04-30 PROCEDURE — 36415 COLL VENOUS BLD VENIPUNCTURE: CPT

## 2023-04-30 PROCEDURE — 51701 INSERT BLADDER CATHETER: CPT

## 2023-04-30 RX ORDER — ONDANSETRON 4 MG/1
4 TABLET, FILM COATED ORAL 3 TIMES DAILY PRN
Qty: 15 TABLET | Refills: 0 | Status: SHIPPED | OUTPATIENT
Start: 2023-04-30

## 2023-04-30 RX ORDER — ONDANSETRON 2 MG/ML
8 INJECTION INTRAMUSCULAR; INTRAVENOUS EVERY 6 HOURS PRN
Status: DISCONTINUED | OUTPATIENT
Start: 2023-04-30 | End: 2023-04-30 | Stop reason: HOSPADM

## 2023-04-30 RX ORDER — SODIUM CHLORIDE, SODIUM LACTATE, POTASSIUM CHLORIDE, CALCIUM CHLORIDE 600; 310; 30; 20 MG/100ML; MG/100ML; MG/100ML; MG/100ML
INJECTION, SOLUTION INTRAVENOUS CONTINUOUS
Status: ACTIVE | OUTPATIENT
Start: 2023-04-30 | End: 2023-04-30

## 2023-04-30 RX ORDER — ASPIRIN 81 MG/1
81 TABLET ORAL DAILY
COMMUNITY

## 2023-04-30 RX ORDER — FERROUS SULFATE 325(65) MG
325 TABLET ORAL
COMMUNITY

## 2023-04-30 RX ORDER — DEXTROSE, SODIUM CHLORIDE, SODIUM LACTATE, POTASSIUM CHLORIDE, AND CALCIUM CHLORIDE 5; .6; .31; .03; .02 G/100ML; G/100ML; G/100ML; G/100ML; G/100ML
INJECTION, SOLUTION INTRAVENOUS ONCE
Status: COMPLETED | OUTPATIENT
Start: 2023-04-30 | End: 2023-04-30

## 2023-04-30 RX ADMIN — ONDANSETRON 8 MG: 2 INJECTION INTRAMUSCULAR; INTRAVENOUS at 09:22

## 2023-04-30 RX ADMIN — SODIUM CHLORIDE, POTASSIUM CHLORIDE, SODIUM LACTATE AND CALCIUM CHLORIDE: 600; 310; 30; 20 INJECTION, SOLUTION INTRAVENOUS at 10:13

## 2023-04-30 RX ADMIN — SODIUM CHLORIDE, SODIUM LACTATE, POTASSIUM CHLORIDE, CALCIUM CHLORIDE AND DEXTROSE MONOHYDRATE: 5; 600; 310; 30; 20 INJECTION, SOLUTION INTRAVENOUS at 09:20

## 2023-05-04 ENCOUNTER — HOSPITAL ENCOUNTER (OUTPATIENT)
Dept: ULTRASOUND IMAGING | Age: 28
Discharge: HOME | End: 2023-05-04
Payer: COMMERCIAL

## 2023-05-04 DIAGNOSIS — O36.5990 FETAL GROWTH RESTRICTION ANTEPARTUM: ICD-10-CM

## 2023-05-04 PROCEDURE — 76816 OB US FOLLOW-UP PER FETUS: CPT

## 2023-05-04 PROCEDURE — 76816 OB US FOLLOW-UP PER FETUS: CPT | Performed by: OBSTETRICS & GYNECOLOGY

## 2023-05-04 PROCEDURE — 99211 OFF/OP EST MAY X REQ PHY/QHP: CPT

## 2023-05-04 NOTE — PROGRESS NOTES
HT: 5' 7\"   WT:  180.8 Lbs.  82.2 Kg. BMI- 28.3  T:     96.3 Skin  BP: 114/57  P: 97  R: 20  Problems/concerns- \"Came 2 days ago to get fluids-I was puking and I guess my body is trying to figure it out. \"  No CoVid signs/symptoms.

## 2023-06-01 ENCOUNTER — HOSPITAL ENCOUNTER (OUTPATIENT)
Dept: ULTRASOUND IMAGING | Age: 28
Discharge: HOME | End: 2023-06-01
Payer: COMMERCIAL

## 2023-06-01 DIAGNOSIS — M32.9 SYSTEMIC LUPUS ERYTHEMATOSUS, UNSPECIFIED SLE TYPE, UNSPECIFIED ORGAN INVOLVEMENT STATUS (HCC): ICD-10-CM

## 2023-06-01 PROCEDURE — 99211 OFF/OP EST MAY X REQ PHY/QHP: CPT

## 2023-06-01 PROCEDURE — 76816 OB US FOLLOW-UP PER FETUS: CPT | Performed by: OBSTETRICS & GYNECOLOGY

## 2023-06-01 PROCEDURE — 76816 OB US FOLLOW-UP PER FETUS: CPT

## 2023-06-01 NOTE — PROGRESS NOTES
HT: 5' 7\"  WT:188.6 Lbs. 85.7 Kg. BMI- 29.5  T: 97.9 Skin  BP: 119/68  P: 95  R: 20  No problems/concerns. No CoVid signs/symptoms.

## 2023-07-01 PROBLEM — O36.8131 DECREASED FETAL MOVEMENT IN PREGNANCY IN THIRD TRIMESTER, ANTEPARTUM, FETUS 1: Status: ACTIVE | Noted: 2023-07-01

## 2023-07-13 PROBLEM — O26.90 PREGNANCY COMPLICATION, ANTEPARTUM: Status: ACTIVE | Noted: 2023-07-13

## 2023-07-18 ENCOUNTER — HOSPITAL ENCOUNTER (INPATIENT)
Age: 28
LOS: 1 days | Discharge: HOME | DRG: 560 | End: 2023-07-19
Attending: OBSTETRICS & GYNECOLOGY | Admitting: OBSTETRICS & GYNECOLOGY
Payer: COMMERCIAL

## 2023-07-18 ENCOUNTER — ANESTHESIA (OUTPATIENT)
Dept: LABOR AND DELIVERY | Age: 28
DRG: 560 | End: 2023-07-18
Payer: COMMERCIAL

## 2023-07-18 ENCOUNTER — APPOINTMENT (OUTPATIENT)
Dept: LABOR AND DELIVERY | Age: 28
DRG: 560 | End: 2023-07-18
Payer: COMMERCIAL

## 2023-07-18 ENCOUNTER — ANESTHESIA EVENT (OUTPATIENT)
Dept: LABOR AND DELIVERY | Age: 28
DRG: 560 | End: 2023-07-18
Payer: COMMERCIAL

## 2023-07-18 PROBLEM — Z34.90 ENCOUNTER FOR INDUCTION OF LABOR: Status: ACTIVE | Noted: 2023-07-18

## 2023-07-18 LAB
ABO: NORMAL
AMPHETAMINES UR QL SCN: NEGATIVE
ANTIBODY SCREEN: NORMAL
BARBITURATES UR QL SCN: NEGATIVE
BASOPHILS ABSOLUTE: 0 THOU/MM3 (ref 0–0.1)
BASOPHILS NFR BLD AUTO: 0.2 %
BENZODIAZ UR QL SCN: NEGATIVE
BZE UR QL SCN: NEGATIVE
CANNABINOIDS UR QL SCN: NEGATIVE
DEPRECATED RDW RBC AUTO: 45.1 FL (ref 35–45)
EOSINOPHIL NFR BLD AUTO: 0.8 %
EOSINOPHILS ABSOLUTE: 0.1 THOU/MM3 (ref 0–0.4)
ERYTHROCYTE [DISTWIDTH] IN BLOOD BY AUTOMATED COUNT: 15.3 % (ref 11.5–14.5)
FENTANYL: NEGATIVE
HCT VFR BLD AUTO: 41.6 % (ref 37–47)
HGB BLD-MCNC: 13.2 GM/DL (ref 12–16)
IMM GRANULOCYTES # BLD AUTO: 0.09 THOU/MM3 (ref 0–0.07)
IMM GRANULOCYTES NFR BLD AUTO: 1.1 %
LYMPHOCYTES ABSOLUTE: 2.4 THOU/MM3 (ref 1–4.8)
LYMPHOCYTES NFR BLD AUTO: 28.8 %
MCH RBC QN AUTO: 26.3 PG (ref 26–33)
MCHC RBC AUTO-ENTMCNC: 31.7 GM/DL (ref 32.2–35.5)
MCV RBC AUTO: 82.9 FL (ref 81–99)
MONOCYTES ABSOLUTE: 0.5 THOU/MM3 (ref 0.4–1.3)
MONOCYTES NFR BLD AUTO: 5.8 %
NEUTROPHILS NFR BLD AUTO: 63.3 %
NRBC BLD AUTO-RTO: 0 /100 WBC
OPIATES UR QL SCN: NEGATIVE
OXYCODONE, OPI5M: NEGATIVE
PCP UR QL SCN: NEGATIVE
PLATELET # BLD AUTO: 176 THOU/MM3 (ref 130–400)
PMV BLD AUTO: 10.3 FL (ref 9.4–12.4)
RBC # BLD AUTO: 5.02 MILL/MM3 (ref 4.2–5.4)
RH FACTOR: NORMAL
RPR SER QL: NONREACTIVE
SEGMENTED NEUTROPHILS ABSOLUTE COUNT: 5.3 THOU/MM3 (ref 1.8–7.7)
WBC # BLD AUTO: 8.4 THOU/MM3 (ref 4.8–10.8)

## 2023-07-18 PROCEDURE — 1200000000 HC SEMI PRIVATE

## 2023-07-18 PROCEDURE — 86850 RBC ANTIBODY SCREEN: CPT

## 2023-07-18 PROCEDURE — 2500000003 HC RX 250 WO HCPCS: Performed by: STUDENT IN AN ORGANIZED HEALTH CARE EDUCATION/TRAINING PROGRAM

## 2023-07-18 PROCEDURE — 0KQM0ZZ REPAIR PERINEUM MUSCLE, OPEN APPROACH: ICD-10-PCS | Performed by: OBSTETRICS & GYNECOLOGY

## 2023-07-18 PROCEDURE — 2500000003 HC RX 250 WO HCPCS

## 2023-07-18 PROCEDURE — 6360000002 HC RX W HCPCS: Performed by: STUDENT IN AN ORGANIZED HEALTH CARE EDUCATION/TRAINING PROGRAM

## 2023-07-18 PROCEDURE — 86901 BLOOD TYPING SEROLOGIC RH(D): CPT

## 2023-07-18 PROCEDURE — 7200000001 HC VAGINAL DELIVERY

## 2023-07-18 PROCEDURE — 3700000025 EPIDURAL BLOCK: Performed by: STUDENT IN AN ORGANIZED HEALTH CARE EDUCATION/TRAINING PROGRAM

## 2023-07-18 PROCEDURE — 6370000000 HC RX 637 (ALT 250 FOR IP): Performed by: OBSTETRICS & GYNECOLOGY

## 2023-07-18 PROCEDURE — 86592 SYPHILIS TEST NON-TREP QUAL: CPT

## 2023-07-18 PROCEDURE — 85025 COMPLETE CBC W/AUTO DIFF WBC: CPT

## 2023-07-18 PROCEDURE — 6360000002 HC RX W HCPCS: Performed by: OBSTETRICS & GYNECOLOGY

## 2023-07-18 PROCEDURE — 80307 DRUG TEST PRSMV CHEM ANLYZR: CPT

## 2023-07-18 PROCEDURE — 10907ZC DRAINAGE OF AMNIOTIC FLUID, THERAPEUTIC FROM PRODUCTS OF CONCEPTION, VIA NATURAL OR ARTIFICIAL OPENING: ICD-10-PCS | Performed by: OBSTETRICS & GYNECOLOGY

## 2023-07-18 PROCEDURE — 2580000003 HC RX 258: Performed by: OBSTETRICS & GYNECOLOGY

## 2023-07-18 PROCEDURE — 3E033VJ INTRODUCTION OF OTHER HORMONE INTO PERIPHERAL VEIN, PERCUTANEOUS APPROACH: ICD-10-PCS | Performed by: OBSTETRICS & GYNECOLOGY

## 2023-07-18 PROCEDURE — 86900 BLOOD TYPING SEROLOGIC ABO: CPT

## 2023-07-18 RX ORDER — MORPHINE SULFATE 2 MG/ML
2 INJECTION, SOLUTION INTRAMUSCULAR; INTRAVENOUS
Status: DISCONTINUED | OUTPATIENT
Start: 2023-07-18 | End: 2023-07-18 | Stop reason: HOSPADM

## 2023-07-18 RX ORDER — OXYTOCIN/0.9 % SODIUM CHLORIDE 30/500 ML
87.3 PLASTIC BAG, INJECTION (ML) INTRAVENOUS PRN
Status: DISCONTINUED | OUTPATIENT
Start: 2023-07-18 | End: 2023-07-19 | Stop reason: HOSPADM

## 2023-07-18 RX ORDER — BUTORPHANOL TARTRATE 1 MG/ML
1 INJECTION, SOLUTION INTRAMUSCULAR; INTRAVENOUS
Status: DISCONTINUED | OUTPATIENT
Start: 2023-07-18 | End: 2023-07-18 | Stop reason: HOSPADM

## 2023-07-18 RX ORDER — TERBUTALINE SULFATE 1 MG/ML
0.25 INJECTION, SOLUTION SUBCUTANEOUS
Status: DISCONTINUED | OUTPATIENT
Start: 2023-07-18 | End: 2023-07-18 | Stop reason: HOSPADM

## 2023-07-18 RX ORDER — DOCUSATE SODIUM 100 MG/1
100 CAPSULE, LIQUID FILLED ORAL 2 TIMES DAILY PRN
Status: DISCONTINUED | OUTPATIENT
Start: 2023-07-18 | End: 2023-07-19 | Stop reason: HOSPADM

## 2023-07-18 RX ORDER — ACETAMINOPHEN 325 MG/1
650 TABLET ORAL EVERY 4 HOURS PRN
Status: DISCONTINUED | OUTPATIENT
Start: 2023-07-18 | End: 2023-07-18 | Stop reason: HOSPADM

## 2023-07-18 RX ORDER — MORPHINE SULFATE 4 MG/ML
2 INJECTION, SOLUTION INTRAMUSCULAR; INTRAVENOUS
Status: DISCONTINUED | OUTPATIENT
Start: 2023-07-18 | End: 2023-07-19 | Stop reason: HOSPADM

## 2023-07-18 RX ORDER — SODIUM CHLORIDE 0.9 % (FLUSH) 0.9 %
5-40 SYRINGE (ML) INJECTION PRN
Status: DISCONTINUED | OUTPATIENT
Start: 2023-07-18 | End: 2023-07-19 | Stop reason: HOSPADM

## 2023-07-18 RX ORDER — ROPIVACAINE HYDROCHLORIDE 2 MG/ML
INJECTION, SOLUTION EPIDURAL; INFILTRATION; PERINEURAL
Status: COMPLETED
Start: 2023-07-18 | End: 2023-07-18

## 2023-07-18 RX ORDER — SODIUM CHLORIDE, SODIUM LACTATE, POTASSIUM CHLORIDE, CALCIUM CHLORIDE 600; 310; 30; 20 MG/100ML; MG/100ML; MG/100ML; MG/100ML
INJECTION, SOLUTION INTRAVENOUS CONTINUOUS
Status: DISCONTINUED | OUTPATIENT
Start: 2023-07-18 | End: 2023-07-19 | Stop reason: HOSPADM

## 2023-07-18 RX ORDER — OXYTOCIN/0.9 % SODIUM CHLORIDE 30/500 ML
1 PLASTIC BAG, INJECTION (ML) INTRAVENOUS CONTINUOUS
Status: DISCONTINUED | OUTPATIENT
Start: 2023-07-18 | End: 2023-07-18

## 2023-07-18 RX ORDER — NALBUPHINE HYDROCHLORIDE 10 MG/ML
5 INJECTION, SOLUTION INTRAMUSCULAR; INTRAVENOUS; SUBCUTANEOUS EVERY 4 HOURS PRN
Status: DISCONTINUED | OUTPATIENT
Start: 2023-07-18 | End: 2023-07-18 | Stop reason: HOSPADM

## 2023-07-18 RX ORDER — EPHEDRINE SULFATE/0.9% NACL/PF 50 MG/5 ML
SYRINGE (ML) INTRAVENOUS
Status: COMPLETED
Start: 2023-07-18 | End: 2023-07-18

## 2023-07-18 RX ORDER — EPHEDRINE SULFATE/0.9% NACL/PF 50 MG/5 ML
10 SYRINGE (ML) INTRAVENOUS ONCE
Status: DISCONTINUED | OUTPATIENT
Start: 2023-07-18 | End: 2023-07-18

## 2023-07-18 RX ORDER — ONDANSETRON 2 MG/ML
8 INJECTION INTRAMUSCULAR; INTRAVENOUS EVERY 6 HOURS PRN
Status: DISCONTINUED | OUTPATIENT
Start: 2023-07-18 | End: 2023-07-18 | Stop reason: HOSPADM

## 2023-07-18 RX ORDER — DIPHENHYDRAMINE HYDROCHLORIDE 50 MG/ML
25 INJECTION INTRAMUSCULAR; INTRAVENOUS EVERY 4 HOURS PRN
Status: DISCONTINUED | OUTPATIENT
Start: 2023-07-18 | End: 2023-07-18 | Stop reason: HOSPADM

## 2023-07-18 RX ORDER — ONDANSETRON 4 MG/1
8 TABLET, ORALLY DISINTEGRATING ORAL EVERY 8 HOURS PRN
Status: DISCONTINUED | OUTPATIENT
Start: 2023-07-18 | End: 2023-07-19 | Stop reason: HOSPADM

## 2023-07-18 RX ORDER — MODIFIED LANOLIN
OINTMENT (GRAM) TOPICAL PRN
Status: DISCONTINUED | OUTPATIENT
Start: 2023-07-18 | End: 2023-07-19 | Stop reason: HOSPADM

## 2023-07-18 RX ORDER — MORPHINE SULFATE 4 MG/ML
4 INJECTION, SOLUTION INTRAMUSCULAR; INTRAVENOUS
Status: DISCONTINUED | OUTPATIENT
Start: 2023-07-18 | End: 2023-07-18 | Stop reason: HOSPADM

## 2023-07-18 RX ORDER — CARBOPROST TROMETHAMINE 250 UG/ML
250 INJECTION, SOLUTION INTRAMUSCULAR PRN
Status: DISCONTINUED | OUTPATIENT
Start: 2023-07-18 | End: 2023-07-19 | Stop reason: HOSPADM

## 2023-07-18 RX ORDER — SODIUM CHLORIDE, SODIUM LACTATE, POTASSIUM CHLORIDE, AND CALCIUM CHLORIDE .6; .31; .03; .02 G/100ML; G/100ML; G/100ML; G/100ML
500 INJECTION, SOLUTION INTRAVENOUS PRN
Status: DISCONTINUED | OUTPATIENT
Start: 2023-07-18 | End: 2023-07-18 | Stop reason: HOSPADM

## 2023-07-18 RX ORDER — SODIUM CHLORIDE, SODIUM LACTATE, POTASSIUM CHLORIDE, CALCIUM CHLORIDE 600; 310; 30; 20 MG/100ML; MG/100ML; MG/100ML; MG/100ML
INJECTION, SOLUTION INTRAVENOUS CONTINUOUS
Status: DISCONTINUED | OUTPATIENT
Start: 2023-07-18 | End: 2023-07-18

## 2023-07-18 RX ORDER — CARBOPROST TROMETHAMINE 250 UG/ML
250 INJECTION, SOLUTION INTRAMUSCULAR PRN
Status: DISCONTINUED | OUTPATIENT
Start: 2023-07-18 | End: 2023-07-18 | Stop reason: HOSPADM

## 2023-07-18 RX ORDER — ONDANSETRON 2 MG/ML
4 INJECTION INTRAMUSCULAR; INTRAVENOUS EVERY 6 HOURS PRN
Status: DISCONTINUED | OUTPATIENT
Start: 2023-07-18 | End: 2023-07-19 | Stop reason: HOSPADM

## 2023-07-18 RX ORDER — ROPIVACAINE HYDROCHLORIDE 2 MG/ML
INJECTION, SOLUTION EPIDURAL; INFILTRATION; PERINEURAL PRN
Status: DISCONTINUED | OUTPATIENT
Start: 2023-07-18 | End: 2023-07-18 | Stop reason: SDUPTHER

## 2023-07-18 RX ORDER — NALOXONE HYDROCHLORIDE 0.4 MG/ML
INJECTION, SOLUTION INTRAMUSCULAR; INTRAVENOUS; SUBCUTANEOUS PRN
Status: DISCONTINUED | OUTPATIENT
Start: 2023-07-18 | End: 2023-07-18 | Stop reason: HOSPADM

## 2023-07-18 RX ORDER — IBUPROFEN 800 MG/1
800 TABLET ORAL EVERY 8 HOURS PRN
Status: DISCONTINUED | OUTPATIENT
Start: 2023-07-18 | End: 2023-07-18 | Stop reason: HOSPADM

## 2023-07-18 RX ORDER — MORPHINE SULFATE 4 MG/ML
4 INJECTION, SOLUTION INTRAMUSCULAR; INTRAVENOUS
Status: DISCONTINUED | OUTPATIENT
Start: 2023-07-18 | End: 2023-07-19 | Stop reason: HOSPADM

## 2023-07-18 RX ORDER — LIDOCAINE HYDROCHLORIDE 10 MG/ML
30 INJECTION, SOLUTION INFILTRATION; PERINEURAL PRN
Status: DISCONTINUED | OUTPATIENT
Start: 2023-07-18 | End: 2023-07-18 | Stop reason: HOSPADM

## 2023-07-18 RX ORDER — SODIUM CHLORIDE 0.9 % (FLUSH) 0.9 %
5-40 SYRINGE (ML) INJECTION EVERY 12 HOURS SCHEDULED
Status: DISCONTINUED | OUTPATIENT
Start: 2023-07-18 | End: 2023-07-19 | Stop reason: HOSPADM

## 2023-07-18 RX ORDER — MISOPROSTOL 200 UG/1
1000 TABLET ORAL PRN
Status: DISCONTINUED | OUTPATIENT
Start: 2023-07-18 | End: 2023-07-19 | Stop reason: HOSPADM

## 2023-07-18 RX ORDER — MISOPROSTOL 200 UG/1
1000 TABLET ORAL PRN
Status: DISCONTINUED | OUTPATIENT
Start: 2023-07-18 | End: 2023-07-18 | Stop reason: HOSPADM

## 2023-07-18 RX ORDER — HYDROXYCHLOROQUINE SULFATE 200 MG/1
200 TABLET, FILM COATED ORAL 2 TIMES DAILY
Status: DISCONTINUED | OUTPATIENT
Start: 2023-07-18 | End: 2023-07-19 | Stop reason: HOSPADM

## 2023-07-18 RX ORDER — IBUPROFEN 800 MG/1
800 TABLET ORAL EVERY 8 HOURS
Status: DISCONTINUED | OUTPATIENT
Start: 2023-07-18 | End: 2023-07-19 | Stop reason: HOSPADM

## 2023-07-18 RX ORDER — FERROUS SULFATE 325(65) MG
325 TABLET ORAL
Status: DISCONTINUED | OUTPATIENT
Start: 2023-07-19 | End: 2023-07-19 | Stop reason: HOSPADM

## 2023-07-18 RX ORDER — FAMOTIDINE 20 MG/1
20 TABLET, FILM COATED ORAL 2 TIMES DAILY PRN
Status: DISCONTINUED | OUTPATIENT
Start: 2023-07-18 | End: 2023-07-19 | Stop reason: HOSPADM

## 2023-07-18 RX ORDER — TRANEXAMIC ACID 10 MG/ML
1000 INJECTION, SOLUTION INTRAVENOUS
Status: DISCONTINUED | OUTPATIENT
Start: 2023-07-18 | End: 2023-07-18 | Stop reason: HOSPADM

## 2023-07-18 RX ORDER — OXYCODONE HYDROCHLORIDE 5 MG/1
10 TABLET ORAL EVERY 4 HOURS PRN
Status: DISCONTINUED | OUTPATIENT
Start: 2023-07-18 | End: 2023-07-19 | Stop reason: HOSPADM

## 2023-07-18 RX ORDER — METHYLERGONOVINE MALEATE 0.2 MG/ML
200 INJECTION INTRAVENOUS PRN
Status: DISCONTINUED | OUTPATIENT
Start: 2023-07-18 | End: 2023-07-19 | Stop reason: HOSPADM

## 2023-07-18 RX ORDER — ACETAMINOPHEN 500 MG
1000 TABLET ORAL EVERY 8 HOURS SCHEDULED
Status: DISCONTINUED | OUTPATIENT
Start: 2023-07-18 | End: 2023-07-19 | Stop reason: HOSPADM

## 2023-07-18 RX ORDER — SODIUM CHLORIDE, SODIUM LACTATE, POTASSIUM CHLORIDE, AND CALCIUM CHLORIDE .6; .31; .03; .02 G/100ML; G/100ML; G/100ML; G/100ML
1000 INJECTION, SOLUTION INTRAVENOUS PRN
Status: DISCONTINUED | OUTPATIENT
Start: 2023-07-18 | End: 2023-07-18 | Stop reason: HOSPADM

## 2023-07-18 RX ORDER — SEVOFLURANE 250 ML/250ML
1 LIQUID RESPIRATORY (INHALATION) CONTINUOUS PRN
Status: DISCONTINUED | OUTPATIENT
Start: 2023-07-18 | End: 2023-07-18 | Stop reason: HOSPADM

## 2023-07-18 RX ORDER — METHYLERGONOVINE MALEATE 0.2 MG/ML
200 INJECTION INTRAVENOUS PRN
Status: DISCONTINUED | OUTPATIENT
Start: 2023-07-18 | End: 2023-07-18 | Stop reason: HOSPADM

## 2023-07-18 RX ORDER — ONDANSETRON 2 MG/ML
4 INJECTION INTRAMUSCULAR; INTRAVENOUS EVERY 6 HOURS PRN
Status: DISCONTINUED | OUTPATIENT
Start: 2023-07-18 | End: 2023-07-18 | Stop reason: HOSPADM

## 2023-07-18 RX ORDER — FENTANYL CITRATE 50 UG/ML
50 INJECTION, SOLUTION INTRAMUSCULAR; INTRAVENOUS
Status: DISCONTINUED | OUTPATIENT
Start: 2023-07-18 | End: 2023-07-18 | Stop reason: HOSPADM

## 2023-07-18 RX ORDER — SODIUM CHLORIDE 9 MG/ML
INJECTION, SOLUTION INTRAVENOUS PRN
Status: DISCONTINUED | OUTPATIENT
Start: 2023-07-18 | End: 2023-07-19 | Stop reason: HOSPADM

## 2023-07-18 RX ORDER — OXYCODONE HYDROCHLORIDE 5 MG/1
5 TABLET ORAL EVERY 4 HOURS PRN
Status: DISCONTINUED | OUTPATIENT
Start: 2023-07-18 | End: 2023-07-19 | Stop reason: HOSPADM

## 2023-07-18 RX ADMIN — Medication 10 MG: at 13:25

## 2023-07-18 RX ADMIN — ACETAMINOPHEN 650 MG: 325 TABLET ORAL at 06:17

## 2023-07-18 RX ADMIN — Medication 166.7 ML: at 18:11

## 2023-07-18 RX ADMIN — SODIUM CHLORIDE, POTASSIUM CHLORIDE, SODIUM LACTATE AND CALCIUM CHLORIDE: 600; 310; 30; 20 INJECTION, SOLUTION INTRAVENOUS at 06:04

## 2023-07-18 RX ADMIN — ROPIVACAINE HYDROCHLORIDE 5 ML: 2 INJECTION, SOLUTION EPIDURAL; INFILTRATION at 11:48

## 2023-07-18 RX ADMIN — Medication 1 MILLI-UNITS/MIN: at 06:46

## 2023-07-18 RX ADMIN — ONDANSETRON 8 MG: 2 INJECTION INTRAMUSCULAR; INTRAVENOUS at 12:02

## 2023-07-18 RX ADMIN — Medication 16 ML/HR: at 11:46

## 2023-07-18 RX ADMIN — ROPIVACAINE HYDROCHLORIDE 5 ML: 2 INJECTION, SOLUTION EPIDURAL; INFILTRATION at 11:42

## 2023-07-18 RX ADMIN — ONDANSETRON 8 MG: 2 INJECTION INTRAMUSCULAR; INTRAVENOUS at 19:14

## 2023-07-18 RX ADMIN — ONDANSETRON 8 MG: 2 INJECTION INTRAMUSCULAR; INTRAVENOUS at 06:15

## 2023-07-18 RX ADMIN — SODIUM CHLORIDE, POTASSIUM CHLORIDE, SODIUM LACTATE AND CALCIUM CHLORIDE: 600; 310; 30; 20 INJECTION, SOLUTION INTRAVENOUS at 09:46

## 2023-07-18 RX ADMIN — SODIUM CHLORIDE, POTASSIUM CHLORIDE, SODIUM LACTATE AND CALCIUM CHLORIDE: 600; 310; 30; 20 INJECTION, SOLUTION INTRAVENOUS at 13:05

## 2023-07-18 ASSESSMENT — PAIN SCALES - GENERAL
PAINLEVEL_OUTOF10: 0
PAINLEVEL_OUTOF10: 8
PAINLEVEL_OUTOF10: 0

## 2023-07-18 ASSESSMENT — PAIN DESCRIPTION - LOCATION: LOCATION: HEAD

## 2023-07-18 ASSESSMENT — PAIN DESCRIPTION - DESCRIPTORS: DESCRIPTORS: ACHING

## 2023-07-18 NOTE — FLOWSHEET NOTE
39wk1d pt of Dr Emilia Nash arrives to 3I20 for scheduled induction. Pt denies leaking of fluid or vaginal bleeding. +FM noted. Patient to bathroom to void, informed of maternal drug testing policy in place on all laboring patients. Verbal consent received, paper consent to be signed and urine to be sent.

## 2023-07-18 NOTE — ANESTHESIA PROCEDURE NOTES
Epidural Block    Patient location during procedure: OB  Start time: 7/18/2023 11:27 AM  End time: 7/18/2023 11:47 AM  Reason for block: labor epidural  Staffing  Performed: anesthesiologist   Anesthesiologist: Princess Salas DO  Epidural  Patient position: sitting  Prep: ChloraPrep  Patient monitoring: cardiac monitor, continuous pulse ox and frequent blood pressure checks  Approach: midline  Location: L4-5  Injection technique: STACEY saline  Guidance: paresthesia technique  Provider prep: mask and sterile gloves  Needle  Needle type: Tuohy   Needle gauge: 18 G  Needle length: 3.5 in  Needle insertion depth: 5 cm  Catheter type: side hole  Catheter at skin depth: 11 cm  Test dose: negativeCatheter Secured: tegaderm and tape  Assessment  Hemodynamics: stable  Attempts: 1  Outcomes: uncomplicated and patient tolerated procedure well  Preanesthetic Checklist  Completed: patient identified, IV checked, site marked, risks and benefits discussed, surgical/procedural consents, equipment checked, pre-op evaluation, timeout performed, anesthesia consent given, oxygen available, monitors applied/VS acknowledged, fire risk safety assessment completed and verbalized and blood product R/B/A discussed and consented

## 2023-07-18 NOTE — DISCHARGE INSTRUCTIONS
DISCHARGE INSTRUCTIONS FOR  PATIENTS AND FAMILY        Discharge Instructions for Labor and Delivery, Vaginal Birth         A vaginal birth refers to the baby being delivered through the vagina. The amount of time that labor can take varies greatly. Labor for the average first-born baby is about 16 hours. Usually your hospital stay after a routine delivery is no more than two nights. Some new mothers go home the same day. Recovery from childbirth varies depending upon whether you had an episiotomy (an incision in the perineum, the area between your vaginal opening and your anus), the duration of labor and delivery, and the amount of rest you get. In general, it takes about 6-8 weeks for a woman's body to recover from childbirth. What You Will Need   Along with your medications, you will need the following:   Sanitary pads    Nursing pads    Witch hazel pads    Possibly a [address] will want to arrange for transportation home for you and your baby. The baby will need a car seat. You will receive instructions in the hospital for breastfeeding and taking care of the perineum area. You may use ointment for cracked nipples or warm water rinses to your perineum. You will need to wear sanitary pads for about six weeks after delivery. If you had an episiotomy or vaginal tear, you will be sent home with a plastic squirt bottle. Fill it with warm water and squirt over the vaginal and anal area every time you urinate and defecate. Warm baths can be soothing to healing tissues. Apply warm or cold cloths to sore breasts. Apply ointment to cracked nipples. Use nursing pads for leaky breast.    Apply witch hazel pads to sore perineum (area between vagina and anus). Ask your doctor about when it is safe for you to shower or bathe. Sit in a sitz bath to soothe sore perineum and/or hemorrhoids. A sitz bath is soaking the hip and buttocks area in warm water.  You can buy a plastic

## 2023-07-18 NOTE — H&P
[facility]  History and Physical Update    Pt Name: Michael Jo  MRN: [id number]  YOB: 1995  Date of evaluation: 2023    [] I have examined the patient and reviewed the H&P/Consult and there are no changes to the patient or plans. [x] I have examined the patient and reviewed the H&P/Consult and have noted the following changes: 34 yo  @ 39+1 wks, IOL for Lupus. GBS neg. Sve: /-3, AROM for scan clear fluid. FHTs: 135, mod shelley, +Accels, no decels toco: q2-4 min. Category I tracing. Reactive. Con't to work towards delivery. Discussion with the patient and/ or family for proposed care, treatment, services; benefits, risks, side effects; likelihood of achieving goals and potential problems that may occur during recuperation was had and all questions were answered. Discussion with the patient and/ or family of reasonable alternatives to the proposed care, treatment, services and the discussion of the risks, benefits, side effects related to the alternatives and the risk related to not receiving the proposed care treatment services was also had and all questions were answered. If this is for an elective surgical procedure then The patient was counseled at length about the risks of jennifer Covid-19 during their perioperative period and any recovery window from their procedure. The patient was made aware that jennifer Covid-19  may worsen their prognosis for recovering from their procedure  and lend to a higher morbidity and/or mortality risk. All material risks, benefits, and reasonable alternatives including postponing the procedure were discussed. The patient  does wish to proceed with the procedure at this time.              Leonarda Hernandez MD,MD  Electronically signed 2023 at 8:21 AM

## 2023-07-18 NOTE — PROGRESS NOTES
Called for delivery. On arrival at bedside, pt complete and pushing adequately with contractions. FHTs: 135, mod shelley, +accels, variable decels toco: q2-3 min. Category II tracing. Overall reassuring. Anticipate .      Mitlon Lopez MD  6:44 PM.  2023

## 2023-07-18 NOTE — FLOWSHEET NOTE
Dr. Mary Ramirez at [facility], given update. Patient received an epidural and is now comfortable. 2-3 cm/70%. Cat 1 FHT. Continue working towards delivery.

## 2023-07-18 NOTE — FLOWSHEET NOTE
Dr. Alba Azul returned page. Updated on SVE and reactive strip. No further questions or concerns at this time. Will continue to work towards vaginal delivery.

## 2023-07-18 NOTE — L&D DELIVERY NOTE
Department of Obstetrics and Gynecology   Vaginal Delivery Note at [facility]      Date of Delivery:  2023    Procedure:  Spontaneous vaginal delivery and Repair second degree, right labial and periurethral spontaneous lacerations    Surgeon:  Sheri Dhillon MD    Anesthesia:  epidural anesthesia    Estimated blood loss:  400ml    Cord blood sent Yes    Complications:  Lupus    Condition:  infant stable to general nursery and mother stable    Details of Procedure: The patient is a 33 y.o.  female at 37w4d  who was admitted for IOL for Lupus. She received the following interventions: ARBOW and IV Pitocin induction. The patient progressed well,did receive an epidural, became complete and started to push. Patient progressed well and the fetal head was delivered over an intact perineum, and the rest of the infant delivered atraumatically, placed on mother abdomen. The cord was clamped and cut after 1 minute and the crying infant placed skin to skin with the waiting nurse at bedside for evaluation. Cord blood and cord segment were collected. The delivery of the placenta was spontaneous. The perineum and vagina were explored and a second degree, right labial and periurethral laceration was repaired in standard fashion with 3-0 vicryl. A vaginal sweep was completed and  sharps were placed in the proper container. Sponge count correct.      Infant Wt: pending    APGARSEddy Go Lev Nic Porter [[id number]]      Apgars    Living status: Living  Apgars   1 Minute:  5 Minute:  10 Minute 15 Minute 20 Minute   Skin Color: 0  1       Heart Rate: 2  2       Reflex Irritability: 2  2       Muscle Tone: 2  2       Respiratory Effort: 2  2       Total: 8  9               Apgars Assigned By: John Sher RN                Electronically signed by Sheri Dhillon MD on 2023 at 6:44 PM

## 2023-07-19 VITALS
BODY MASS INDEX: 31.86 KG/M2 | HEART RATE: 82 BPM | DIASTOLIC BLOOD PRESSURE: 71 MMHG | WEIGHT: 203 LBS | RESPIRATION RATE: 16 BRPM | SYSTOLIC BLOOD PRESSURE: 113 MMHG | OXYGEN SATURATION: 100 % | HEIGHT: 67 IN | TEMPERATURE: 97.3 F

## 2023-07-19 PROCEDURE — 6370000000 HC RX 637 (ALT 250 FOR IP): Performed by: OBSTETRICS & GYNECOLOGY

## 2023-07-19 PROCEDURE — 6360000002 HC RX W HCPCS: Performed by: OBSTETRICS & GYNECOLOGY

## 2023-07-19 PROCEDURE — 90707 MMR VACCINE SC: CPT | Performed by: OBSTETRICS & GYNECOLOGY

## 2023-07-19 PROCEDURE — 90471 IMMUNIZATION ADMIN: CPT | Performed by: OBSTETRICS & GYNECOLOGY

## 2023-07-19 RX ORDER — IBUPROFEN 600 MG/1
600 TABLET ORAL EVERY 6 HOURS PRN
Qty: 30 TABLET | Refills: 1 | COMMUNITY
Start: 2023-07-19

## 2023-07-19 RX ORDER — ACETAMINOPHEN 325 MG/1
650 TABLET ORAL EVERY 6 HOURS PRN
Qty: 120 TABLET | Refills: 3 | COMMUNITY
Start: 2023-07-19

## 2023-07-19 RX ADMIN — IBUPROFEN 800 MG: 800 TABLET ORAL at 17:49

## 2023-07-19 RX ADMIN — DOCUSATE SODIUM 100 MG: 100 CAPSULE, LIQUID FILLED ORAL at 07:51

## 2023-07-19 RX ADMIN — HYDROXYCHLOROQUINE SULFATE 200 MG: 200 TABLET, FILM COATED ORAL at 07:53

## 2023-07-19 RX ADMIN — IBUPROFEN 800 MG: 800 TABLET ORAL at 02:19

## 2023-07-19 RX ADMIN — MEASLES, MUMPS, AND RUBELLA VIRUS VACCINE LIVE 0.5 ML: 1000; 12500; 1000 INJECTION, POWDER, LYOPHILIZED, FOR SUSPENSION SUBCUTANEOUS at 08:26

## 2023-07-19 RX ADMIN — ACETAMINOPHEN 1000 MG: 500 TABLET ORAL at 07:51

## 2023-07-19 ASSESSMENT — PAIN SCALES - GENERAL
PAINLEVEL_OUTOF10: 6
PAINLEVEL_OUTOF10: 7
PAINLEVEL_OUTOF10: 0
PAINLEVEL_OUTOF10: 7

## 2023-07-19 ASSESSMENT — PAIN DESCRIPTION - ONSET: ONSET: ON-GOING

## 2023-07-19 ASSESSMENT — PAIN DESCRIPTION - DESCRIPTORS
DESCRIPTORS: DISCOMFORT
DESCRIPTORS: CRAMPING
DESCRIPTORS: CRAMPING

## 2023-07-19 ASSESSMENT — PAIN - FUNCTIONAL ASSESSMENT
PAIN_FUNCTIONAL_ASSESSMENT: ACTIVITIES ARE NOT PREVENTED
PAIN_FUNCTIONAL_ASSESSMENT: ACTIVITIES ARE NOT PREVENTED

## 2023-07-19 ASSESSMENT — PAIN DESCRIPTION - PAIN TYPE: TYPE: ACUTE PAIN

## 2023-07-19 ASSESSMENT — PAIN DESCRIPTION - LOCATION
LOCATION: ABDOMEN

## 2023-07-19 ASSESSMENT — PAIN DESCRIPTION - ORIENTATION
ORIENTATION: ANTERIOR
ORIENTATION: LOWER

## 2023-07-19 ASSESSMENT — PAIN DESCRIPTION - FREQUENCY: FREQUENCY: INTERMITTENT

## 2023-07-19 NOTE — PROGRESS NOTES
Discharge teaching and instructions for diagnosis/procedure of SVDV completed with patient using teachback method. AVS reviewed. Patient voiced understanding regarding follow up appointments, and care of self at home.  Discharged in a wheelchair to  home with support per family

## 2023-07-19 NOTE — ANESTHESIA POSTPROCEDURE EVALUATION
Department of Anesthesiology  Postprocedure Note    Patient: Kirill Oconnell  MRN: [id number]  YOB: 1995  Date of evaluation: 7/19/2023      Procedure Summary     Date: 07/18/23 Room / Location:     Anesthesia Start: 1123 Anesthesia Stop: 1805    Procedure: Labor Analgesia Diagnosis:     Scheduled Providers:  Responsible Provider: Kashmir Alex DO    Anesthesia Type: epidural ASA Status: 2          Anesthesia Type: No value filed.     Haylee Phase I: Haylee Score: 9    Haylee Phase II:        Anesthesia Post Evaluation    Patient location during evaluation: bedside  Patient participation: complete - patient participated  Level of consciousness: awake and alert  Airway patency: patent  Nausea & Vomiting: no nausea and no vomiting  Complications: no  Cardiovascular status: hemodynamically stable  Respiratory status: spontaneous ventilation, room air and nonlabored ventilation  Hydration status: stable

## 2023-07-19 NOTE — DISCHARGE SUMMARY
Obstetric Discharge Summary      Pt Name: Alma Harada  MRN: [id number] [facility] #: [de-identified]  YOB: 1995        Admitting Diagnosis  33 y.o.  @ 39+1 wks IUP presented with IOL for Lupus. Reasons for Admission on 2023  5:34 AM  Vaginal Delivery    Hospital course: 33 y.o.  @ 39+1 wks IUP presented with IOL for Lupus. She progressed well and delivered via . Her post partum course was uncomplicated. She was discharged home on PPD#1 in good condition. Postpartum/Operative Complications  none    Discharge Diagnosis  male infant      Discharge Information  Current Discharge Medication List        START taking these medications    Details   ibuprofen (ADVIL;MOTRIN) 600 MG tablet Take 1 tablet by mouth every 6 hours as needed for Pain  Qty: 30 tablet, Refills: 1      acetaminophen (AMINOFEN) 325 MG tablet Take 2 tablets by mouth every 6 hours as needed for Pain  Qty: 120 tablet, Refills: 3           CONTINUE these medications which have NOT CHANGED    Details   hydroxychloroquine (PLAQUENIL) 200 MG tablet Take 1 tablet by mouth 2 times daily  Qty: 60 tablet, Refills: 5    Associated Diagnoses: Systemic lupus erythematosus, unspecified SLE type, unspecified organ involvement status ([address])      Prenatal Vit-Fe Fumarate-FA (PRENATAL 1+1 PO) Take by mouth           STOP taking these medications       aspirin 81 MG EC tablet Comments:   Reason for Stopping:         ferrous sulfate (IRON 325) 325 (65 Fe) MG tablet Comments:   Reason for Stopping:         ondansetron (ZOFRAN) 4 MG tablet Comments:   Reason for Stopping:                 Diet: regular  Activity: nothing in the vagina for 6 weeks-no sex, no tampons, no douching, no heavy lifting, limited activity for 2-3 weeks  Discharge to:  home  Follow up in 6 wks.     Tejas Mora MD  11:16 AM  2023

## 2023-07-19 NOTE — PLAN OF CARE
Problem: Pain  Goal: Verbalizes/displays adequate comfort level or baseline comfort level  2023 by Dulcy Gottron, RN  Outcome: Progressing  Flowsheets (Taken 2023 8739 by Luisito Bone RN)  Verbalizes/displays adequate comfort level or baseline comfort level:   Encourage patient to monitor pain and request assistance   Assess pain using appropriate pain scale   Implement non-pharmacological measures as appropriate and evaluate response   Notify Licensed Independent Practitioner if interventions unsuccessful or patient reports new pain   Administer analgesics based on type and severity of pain and evaluate response     Problem: Vaginal Birth or  Section  Goal: Fetal and maternal status remain reassuring during the birth process  Description:  Birth OB-Pregnancy care plan goal which identifies if the fetal and maternal status remain reassuring during the birth process  2023 by Dulcy Gottron, RN  Outcome: Progressing  Flowsheets (Taken 2023 6170 by Luisito Bone RN)  Fetal and Maternal Status Remain Reassuring During the Birth Process:   Monitor vital signs   Monitor fetal heart rate   Monitor uterine activity   Monitor labor progression (Vaginal delivery)     Problem: Infection - Adult  Goal: Absence of infection at discharge  2023 by Dulcy Gottron, RN  Outcome: Progressing  [address] (Taken 2023 5967 by Luisito Bone RN)  Absence of infection at discharge:   Assess and monitor for signs and symptoms of infection   Monitor lab/diagnostic results   Monitor all insertion sites i.e., indwelling lines, tubes and drains   Administer medications as ordered   Instruct and encourage patient and family to use good hand hygiene technique     Problem: Safety - Adult  Goal: Free from fall injury  2023 by Dulcy Gottron, RN  Outcome: Progressing  [address] (Taken 2023 0642 by Luisito Bone RN)  Free From Fall Injury: Instruct
Problem: Pain  Goal: Verbalizes/displays adequate comfort level or baseline comfort level  Outcome: Progressing  Flowsheets (Taken 2023 0295)  Verbalizes/displays adequate comfort level or baseline comfort level:   Encourage patient to monitor pain and request assistance   Assess pain using appropriate pain scale   Implement non-pharmacological measures as appropriate and evaluate response   Notify Licensed Independent Practitioner if interventions unsuccessful or patient reports new pain   Administer analgesics based on type and severity of pain and evaluate response     Problem: Vaginal Birth or  Section  Goal: Fetal and maternal status remain reassuring during the birth process  Description:  Birth OB-Pregnancy care plan goal which identifies if the fetal and maternal status remain reassuring during the birth process  Outcome: Progressing  Flowsheets (Taken 2023 9265)  Fetal and Maternal Status Remain Reassuring During the Birth Process:   Monitor vital signs   Monitor fetal heart rate   Monitor uterine activity   Monitor labor progression (Vaginal delivery)     Problem: Infection - Adult  Goal: Absence of infection at discharge  Outcome: Progressing  Flowsheets (Taken 2023 0642)  Absence of infection at discharge:   Assess and monitor for signs and symptoms of infection   Monitor lab/diagnostic results   Monitor all insertion sites i.e., indwelling lines, tubes and drains   Administer medications as ordered   Instruct and encourage patient and family to use good hand hygiene technique     Problem: Safety - Adult  Goal: Free from fall injury  Outcome: Progressing  Flowsheets (Taken 2023 0642)  Free From Fall Injury: Instruct family/caregiver on patient safety     Problem: Discharge Planning  Goal: Discharge to home or other facility with appropriate resources  Outcome: Progressing  Flowsheets (Taken 2023 0311)  Discharge to home or other facility with appropriate resources:
Problem: Postpartum  Goal: Experiences normal postpartum course  Description:  Postpartum OB-Pregnancy care plan goal which identifies if the mother is experiencing a normal postpartum course  Outcome: Progressing  Flowsheets (Taken 2023)  Experiences Normal Postpartum Course: Monitor maternal vital signs  Goal: Appropriate maternal -  bonding  Description:  Postpartum OB-Pregnancy care plan goal which identifies if the mother and  are bonding appropriately  Outcome: Progressing  Note: Bonding with baby, participating in infant care.    Goal: Establishment of infant feeding pattern  Description:  Postpartum OB-Pregnancy care plan goal which identifies if the mother is establishing a feeding pattern with their   Outcome: Progressing  Flowsheets (Taken 2023)  Establishment of Infant Feeding Pattern: Assess breast/bottle feeding  Goal: Incisions, wounds, or drain sites healing without S/S of infection  Outcome: Progressing  Flowsheets (Taken 2023)  Incisions, Wounds, or Drain Sites Healing Without Sign and Symptoms of Infection: TWICE DAILY: Assess and document skin integrity     Problem: Pain  Goal: Verbalizes/displays adequate comfort level or baseline comfort level  Outcome: Progressing  Flowsheets (Taken 2023)  Verbalizes/displays adequate comfort level or baseline comfort level: Encourage patient to monitor pain and request assistance     Problem: Infection - Adult  Goal: Absence of infection at discharge  Outcome: Progressing  Flowsheets (Taken 2023)  Absence of infection at discharge:   Assess and monitor for signs and symptoms of infection   Monitor lab/diagnostic results  Goal: Absence of infection during hospitalization  Outcome: Progressing  Flowsheets (Taken 2023)  Absence of infection during hospitalization: Assess and monitor for signs and symptoms of infection  Goal: Absence of fever/infection during anticipated neutropenic
discharge  7/19/2023 1109 by Rigoberto Villegas RN  Outcome: Completed  7/19/2023 0002 by Nani Branch RN  Outcome: Progressing  Flowsheets (Taken 7/18/2023 2058)  Absence of infection at discharge:   Assess and monitor for signs and symptoms of infection   Monitor lab/diagnostic results  Goal: Absence of infection during hospitalization  7/19/2023 1109 by Rigoberto Villegas RN  Outcome: Completed  7/19/2023 0002 by Nani Branch RN  Outcome: Progressing  Flowsheets (Taken 7/18/2023 2058)  Absence of infection during hospitalization: Assess and monitor for signs and symptoms of infection  Goal: Absence of fever/infection during anticipated neutropenic period  7/19/2023 1109 by Rigoberto Villegas RN  Outcome: Completed  7/19/2023 0002 by Nani Branch RN  Outcome: Progressing  Flowsheets (Taken 7/19/2023 0002)  Absence of fever/infection during anticipated neutropenic period: Monitor white blood cell count     Problem: Safety - Adult  Goal: Free from fall injury  7/19/2023 1109 by Rigoberto Villegas RN  Outcome: Completed  7/19/2023 0002 by Nani Branch RN  Outcome: Progressing  [address] (Taken 7/18/2023 2058)  Free From Fall Injury: Instruct family/caregiver on patient safety     Problem: Discharge Planning  Goal: Discharge to home or other facility with appropriate resources  7/19/2023 1109 by Rigoberto Villegas RN  Outcome: Completed  7/19/2023 0002 by Nani Branch RN  Outcome: Progressing  Flowsheets (Taken 7/18/2023 2058)  Discharge to home or other facility with appropriate resources: Identify barriers to discharge with patient and caregiver     Problem: Chronic Conditions and Co-morbidities  Goal: Patient's chronic conditions and co-morbidity symptoms are monitored and maintained or improved  7/19/2023 1109 by Rigoberto Villegas RN  Outcome: Completed  7/19/2023 0002 by Nani Branch RN  Outcome: Progressing  Flowsheets (Taken 7/19/2023 0002)  Care Plan - Patient's Chronic Conditions and Co-Morbidity Symptoms are Monitored and

## 2023-07-19 NOTE — PROGRESS NOTES
Pt admitted to  5AB20 via in a wheelchair from  l and d . Complaints: None. IV lactated Ringer's infusing into the forearm left, condition patent and no redness at a rate of 125 mls/ hour. IV site free of s/s of infection or infiltration. Vital signs obtained. Assessment and data collection initiated. Oriented to room. Policies and procedures for 5AB explained. All questions answered with no further questions at this time. Fall prevention and safety brochure discussed with patient. Bed alarm on. Call light in reach. Oriented to room. Explained patients right to have family, representative or physician notified of their admission. Patient has Declined for physician to be notified. Patient has Declined for family/representative to be notified.

## 2023-07-19 NOTE — PROGRESS NOTES
Post birth warning signs education paper given and reviewed, teaching complete. [place] postpartum depression screening discussed with patient, instructed to contact her healthcare provider if her score is > 10. Patient voiced understanding.   Mother's blood type is A+

## 2023-07-19 NOTE — FLOWSHEET NOTE
Patient transported to mom/baby via wheelchair in stable condition. Baby in arms. Respirations easy and non-labored. No further questions or concerns at this time.

## 2023-08-23 ENCOUNTER — NURSE ONLY (OUTPATIENT)
Dept: LAB | Age: 28
End: 2023-08-23

## 2023-08-31 ENCOUNTER — HOSPITAL ENCOUNTER (OUTPATIENT)
Dept: WOMENS IMAGING | Age: 28
Discharge: HOME | End: 2023-08-31
Attending: OBSTETRICS & GYNECOLOGY
Payer: COMMERCIAL

## 2023-08-31 VITALS — WEIGHT: 188 LBS | HEIGHT: 67 IN | BODY MASS INDEX: 29.51 KG/M2

## 2023-08-31 DIAGNOSIS — N63.21 BREAST LUMP ON LEFT SIDE AT 1 O'CLOCK POSITION: ICD-10-CM

## 2023-08-31 PROCEDURE — 76642 ULTRASOUND BREAST LIMITED: CPT

## 2023-09-01 LAB — CYTOLOGY THIN PREP PAP: NORMAL

## 2023-09-14 ENCOUNTER — OFFICE VISIT (OUTPATIENT)
Dept: RHEUMATOLOGY | Age: 28
End: 2023-09-14
Payer: COMMERCIAL

## 2023-09-14 VITALS
BODY MASS INDEX: 29.51 KG/M2 | OXYGEN SATURATION: 97 % | HEIGHT: 67 IN | HEART RATE: 89 BPM | SYSTOLIC BLOOD PRESSURE: 128 MMHG | DIASTOLIC BLOOD PRESSURE: 72 MMHG | WEIGHT: 188 LBS

## 2023-09-14 DIAGNOSIS — M32.9 SYSTEMIC LUPUS ERYTHEMATOSUS, UNSPECIFIED SLE TYPE, UNSPECIFIED ORGAN INVOLVEMENT STATUS (HCC): ICD-10-CM

## 2023-09-14 PROCEDURE — 99214 OFFICE O/P EST MOD 30 MIN: CPT | Performed by: INTERNAL MEDICINE

## 2023-09-14 PROCEDURE — 1036F TOBACCO NON-USER: CPT | Performed by: INTERNAL MEDICINE

## 2023-09-14 PROCEDURE — G8417 CALC BMI ABV UP PARAM F/U: HCPCS | Performed by: INTERNAL MEDICINE

## 2023-09-14 PROCEDURE — G8427 DOCREV CUR MEDS BY ELIG CLIN: HCPCS | Performed by: INTERNAL MEDICINE

## 2023-09-14 RX ORDER — HYDROXYCHLOROQUINE SULFATE 200 MG/1
200 TABLET, FILM COATED ORAL 2 TIMES DAILY
Qty: 60 TABLET | Refills: 5 | Status: SHIPPED | OUTPATIENT
Start: 2023-09-14

## 2023-09-14 ASSESSMENT — ENCOUNTER SYMPTOMS
NAUSEA: 0
COUGH: 0
ABDOMINAL PAIN: 0
VOMITING: 0
SHORTNESS OF BREATH: 0
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
## 2023-12-27 ENCOUNTER — HOSPITAL ENCOUNTER (EMERGENCY)
Age: 28
Discharge: HOME | End: 2023-12-27
Payer: COMMERCIAL

## 2023-12-27 VITALS
BODY MASS INDEX: 31.39 KG/M2 | SYSTOLIC BLOOD PRESSURE: 110 MMHG | DIASTOLIC BLOOD PRESSURE: 74 MMHG | HEART RATE: 92 BPM | OXYGEN SATURATION: 96 % | TEMPERATURE: 97.1 F | RESPIRATION RATE: 16 BRPM | WEIGHT: 200 LBS | HEIGHT: 67 IN

## 2023-12-27 DIAGNOSIS — H10.9 CONJUNCTIVITIS OF BOTH EYES, UNSPECIFIED CONJUNCTIVITIS TYPE: Primary | ICD-10-CM

## 2023-12-27 PROCEDURE — 99213 OFFICE O/P EST LOW 20 MIN: CPT | Performed by: NURSE PRACTITIONER

## 2023-12-27 PROCEDURE — 99213 OFFICE O/P EST LOW 20 MIN: CPT

## 2023-12-27 RX ORDER — GENTAMICIN SULFATE 3 MG/ML
1 SOLUTION/ DROPS OPHTHALMIC 4 TIMES DAILY
Qty: 1 EACH | Refills: 0 | Status: SHIPPED | OUTPATIENT
Start: 2023-12-27 | End: 2024-01-03

## 2023-12-27 ASSESSMENT — PAIN - FUNCTIONAL ASSESSMENT: PAIN_FUNCTIONAL_ASSESSMENT: NONE - DENIES PAIN
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
## 2023-12-27 NOTE — ED TRIAGE NOTES
Arrives to [facility] for the evaluation of bilateral eye itching with occasional \"burning\" pain. Symptoms started today. States her daughter was Dx with pink eye yesterday. Patients  switched her pillow with her daughters. Patient used the pillow and has been exposed to her daughter. Has bilateral eye drainage that is Green/yellow in color. Waiting provider to assess.

## 2024-03-08 ENCOUNTER — NURSE ONLY (OUTPATIENT)
Dept: LAB | Age: 29
End: 2024-03-08

## 2024-03-08 DIAGNOSIS — M32.9 SYSTEMIC LUPUS ERYTHEMATOSUS, UNSPECIFIED SLE TYPE, UNSPECIFIED ORGAN INVOLVEMENT STATUS (HCC): ICD-10-CM

## 2024-03-08 LAB
ALBUMIN SERPL BCG-MCNC: 4.4 G/DL (ref 3.5–5.1)
ALP SERPL-CCNC: 89 U/L (ref 38–126)
ALT SERPL W/O P-5'-P-CCNC: 14 U/L (ref 11–66)
ANION GAP SERPL CALC-SCNC: 12 MEQ/L (ref 8–16)
AST SERPL-CCNC: 15 U/L (ref 5–40)
BASOPHILS ABSOLUTE: 0 THOU/MM3 (ref 0–0.1)
BASOPHILS NFR BLD AUTO: 0.3 %
BILIRUB SERPL-MCNC: 0.3 MG/DL (ref 0.3–1.2)
BUN SERPL-MCNC: 11 MG/DL (ref 7–22)
C3C SERPL-MCNC: 132 MG/DL (ref 90–180)
C4 SERPL-MCNC: 26 MG/DL (ref 10–40)
CALCIUM SERPL-MCNC: 9.3 MG/DL (ref 8.5–10.5)
CHLORIDE SERPL-SCNC: 104 MEQ/L (ref 98–111)
CO2 SERPL-SCNC: 23 MEQ/L (ref 23–33)
CREAT SERPL-MCNC: 0.6 MG/DL (ref 0.4–1.2)
CRP SERPL-MCNC: 0.83 MG/DL (ref 0–1)
DEPRECATED RDW RBC AUTO: 40.7 FL (ref 35–45)
EOSINOPHIL NFR BLD AUTO: 1 %
EOSINOPHILS ABSOLUTE: 0.1 THOU/MM3 (ref 0–0.4)
ERYTHROCYTE [DISTWIDTH] IN BLOOD BY AUTOMATED COUNT: 13.2 % (ref 11.5–14.5)
ERYTHROCYTE [SEDIMENTATION RATE] IN BLOOD BY WESTERGREN METHOD: 26 MM/HR (ref 0–20)
GFR SERPL CREATININE-BSD FRML MDRD: > 60 ML/MIN/1.73M2
GLUCOSE SERPL-MCNC: 73 MG/DL (ref 70–108)
HCT VFR BLD AUTO: 42.5 % (ref 37–47)
HGB BLD-MCNC: 13 GM/DL (ref 12–16)
IMM GRANULOCYTES # BLD AUTO: 0.02 THOU/MM3 (ref 0–0.07)
IMM GRANULOCYTES NFR BLD AUTO: 0.3 %
LYMPHOCYTES ABSOLUTE: 2.2 THOU/MM3 (ref 1–4.8)
LYMPHOCYTES NFR BLD AUTO: 32.2 %
MCH RBC QN AUTO: 25.9 PG (ref 26–33)
MCHC RBC AUTO-ENTMCNC: 30.6 GM/DL (ref 32.2–35.5)
MCV RBC AUTO: 84.8 FL (ref 81–99)
MONOCYTES ABSOLUTE: 0.4 THOU/MM3 (ref 0.4–1.3)
MONOCYTES NFR BLD AUTO: 5.6 %
NEUTROPHILS NFR BLD AUTO: 60.6 %
NRBC BLD AUTO-RTO: 0 /100 WBC
PLATELET # BLD AUTO: 203 THOU/MM3 (ref 130–400)
PMV BLD AUTO: 10.2 FL (ref 9.4–12.4)
POTASSIUM SERPL-SCNC: 3.8 MEQ/L (ref 3.5–5.2)
PROT SERPL-MCNC: 7.4 G/DL (ref 6.1–8)
RBC # BLD AUTO: 5.01 MILL/MM3 (ref 4.2–5.4)
SEGMENTED NEUTROPHILS ABSOLUTE COUNT: 4.1 THOU/MM3 (ref 1.8–7.7)
SODIUM SERPL-SCNC: 139 MEQ/L (ref 135–145)
WBC # BLD AUTO: 6.8 THOU/MM3 (ref 4.8–10.8)

## 2024-03-10 LAB — DSDNA IGG SER QL IA: NORMAL

## 2024-03-14 ENCOUNTER — OFFICE VISIT (OUTPATIENT)
Dept: RHEUMATOLOGY | Age: 29
End: 2024-03-14
Payer: COMMERCIAL

## 2024-03-14 VITALS
HEART RATE: 98 BPM | BODY MASS INDEX: 31.55 KG/M2 | WEIGHT: 201 LBS | HEIGHT: 67 IN | OXYGEN SATURATION: 98 % | DIASTOLIC BLOOD PRESSURE: 68 MMHG | SYSTOLIC BLOOD PRESSURE: 116 MMHG

## 2024-03-14 DIAGNOSIS — M32.9 SYSTEMIC LUPUS ERYTHEMATOSUS, UNSPECIFIED SLE TYPE, UNSPECIFIED ORGAN INVOLVEMENT STATUS (HCC): ICD-10-CM

## 2024-03-14 PROCEDURE — 1036F TOBACCO NON-USER: CPT | Performed by: INTERNAL MEDICINE

## 2024-03-14 PROCEDURE — G8484 FLU IMMUNIZE NO ADMIN: HCPCS | Performed by: INTERNAL MEDICINE

## 2024-03-14 PROCEDURE — 99214 OFFICE O/P EST MOD 30 MIN: CPT | Performed by: INTERNAL MEDICINE

## 2024-03-14 PROCEDURE — G8417 CALC BMI ABV UP PARAM F/U: HCPCS | Performed by: INTERNAL MEDICINE

## 2024-03-14 PROCEDURE — G8427 DOCREV CUR MEDS BY ELIG CLIN: HCPCS | Performed by: INTERNAL MEDICINE

## 2024-03-14 RX ORDER — HYDROXYCHLOROQUINE SULFATE 200 MG/1
200 TABLET, FILM COATED ORAL 2 TIMES DAILY
Qty: 60 TABLET | Refills: 5 | Status: SHIPPED | OUTPATIENT
Start: 2024-03-14

## 2024-03-14 ASSESSMENT — ENCOUNTER SYMPTOMS
NAUSEA: 0
ABDOMINAL PAIN: 0
SHORTNESS OF BREATH: 0
COUGH: 0
VOMITING: 0

## 2024-03-14 NOTE — PROGRESS NOTES
gm/dl 13.0   Hematocrit 37.0 - 47.0 % 42.5   MCV 81.0 - 99.0 fL 84.8   MCH 26.0 - 33.0 pg 25.9 (L)   MCHC 32.2 - 35.5 gm/dl 30.6 (L)   MPV 9.4 - 12.4 fL 10.2   RDW-CV 11.5 - 14.5 % 13.2   RDW-SD 35.0 - 45.0 fL 40.7   Platelet Count 130 - 400 thou/mm3 203   Lymphocytes Absolute 1.0 - 4.8 thou/mm3 2.2   Monocytes Absolute 0.4 - 1.3 thou/mm3 0.4   Eosinophils Absolute 0.0 - 0.4 thou/mm3 0.1   Basophils Absolute 0.0 - 0.1 thou/mm3 0.0   Seg Neutrophils % 60.6   Segs Absolute 1.8 - 7.7 thou/mm3 4.1   Lymphocytes % 32.2   Monocytes % 5.6   Eosinophils % 1.0   Basophils % 0.3   Immature Grans (Abs) 0.00 - 0.07 thou/mm3 0.02   Immature Granulocytes % 0.3   Nucleated Red Blood Cells /100 wbc 0   Sed Rate 0 - 20 mm/hr 26 (H)      Latest Reference Range & Units 03/08/24 09:43   dsDNA Ab 0 - 24 mU 4   C3 Complement 90 - 180 mg/dL 132   Complement C4 10 - 40 mg/dL 26       RAPID3 Composite Score = MDHAQ (0-10) + Patient pain VAS (0-10): + Patient global assessment VAS (0-10):     3/14/2023 --- RAPID 3: 0 + 0 + 0 = 0   6/14/23  --- RAPID 3: 0 + 0 + 0 = 0    9/14/23  --- RAPID 3: 0 + 0 + 0 = 0    3/14/24  --- RAPID 3: 0 + 0 + 0 = 0     Remission: <3  Low Disease Activity: <6  Moderate Disease Activity: >=6 and <=12  High Disease Activity: >12        IMPRESSION/RECOMMENDATIONS:      1. SLE: overall doing well clinically with current medication.   Reviewed with pt her lab results  -- continue Plaquenil 200 mg bid    RTC in 6 months or sooner if needed        No orders of the defined types were placed in this encounter.           Marylou Patterson MD    [facility] RHEUMATOLOGY  [address] [place] [place]  [address]  [facility] [address]  [phone]

## 2024-03-23 ENCOUNTER — APPOINTMENT (OUTPATIENT)
Dept: ULTRASOUND IMAGING | Age: 29
End: 2024-03-23
Payer: COMMERCIAL

## 2024-03-23 ENCOUNTER — HOSPITAL ENCOUNTER (EMERGENCY)
Age: 29
Discharge: HOME | End: 2024-03-23
Attending: EMERGENCY MEDICINE
Payer: COMMERCIAL

## 2024-03-23 VITALS
SYSTOLIC BLOOD PRESSURE: 99 MMHG | DIASTOLIC BLOOD PRESSURE: 59 MMHG | TEMPERATURE: 98.2 F | HEART RATE: 83 BPM | OXYGEN SATURATION: 100 % | BODY MASS INDEX: 31.39 KG/M2 | HEIGHT: 67 IN | RESPIRATION RATE: 16 BRPM | WEIGHT: 200 LBS

## 2024-03-23 DIAGNOSIS — K52.9 GASTROENTERITIS: Primary | ICD-10-CM

## 2024-03-23 DIAGNOSIS — Z34.90 INTRAUTERINE PREGNANCY: ICD-10-CM

## 2024-03-23 LAB
ALBUMIN SERPL BCG-MCNC: 4.4 G/DL (ref 3.5–5.1)
ALP SERPL-CCNC: 86 U/L (ref 38–126)
ALT SERPL W/O P-5'-P-CCNC: 19 U/L (ref 11–66)
ANION GAP SERPL CALC-SCNC: 16 MEQ/L (ref 8–16)
AST SERPL-CCNC: 24 U/L (ref 5–40)
B-HCG SERPL QL: POSITIVE
BASOPHILS ABSOLUTE: 0 THOU/MM3 (ref 0–0.1)
BASOPHILS NFR BLD AUTO: 0.2 %
BILIRUB SERPL-MCNC: 0.5 MG/DL (ref 0.3–1.2)
BUN SERPL-MCNC: 11 MG/DL (ref 7–22)
CALCIUM SERPL-MCNC: 8.5 MG/DL (ref 8.5–10.5)
CHLORIDE SERPL-SCNC: 102 MEQ/L (ref 98–111)
CO2 SERPL-SCNC: 20 MEQ/L (ref 23–33)
CREAT SERPL-MCNC: 0.5 MG/DL (ref 0.4–1.2)
DEPRECATED RDW RBC AUTO: 39 FL (ref 35–45)
EOSINOPHIL NFR BLD AUTO: 1.2 %
EOSINOPHILS ABSOLUTE: 0.1 THOU/MM3 (ref 0–0.4)
ERYTHROCYTE [DISTWIDTH] IN BLOOD BY AUTOMATED COUNT: 13.4 % (ref 11.5–14.5)
GFR SERPL CREATININE-BSD FRML MDRD: > 60 ML/MIN/1.73M2
GLUCOSE SERPL-MCNC: 95 MG/DL (ref 70–108)
HCG INTACT+B SERPL-ACNC: ABNORMAL MIU/ML (ref 0–5)
HCG UR QL: POSITIVE
HCT VFR BLD AUTO: 42.5 % (ref 37–47)
HGB BLD-MCNC: 13.8 GM/DL (ref 12–16)
IMM GRANULOCYTES # BLD AUTO: 0.03 THOU/MM3 (ref 0–0.07)
IMM GRANULOCYTES NFR BLD AUTO: 0.3 %
LYMPHOCYTES ABSOLUTE: 1.7 THOU/MM3 (ref 1–4.8)
LYMPHOCYTES NFR BLD AUTO: 18.2 %
MCH RBC QN AUTO: 26.3 PG (ref 26–33)
MCHC RBC AUTO-ENTMCNC: 32.5 GM/DL (ref 32.2–35.5)
MCV RBC AUTO: 81 FL (ref 81–99)
MONOCYTES ABSOLUTE: 0.5 THOU/MM3 (ref 0.4–1.3)
MONOCYTES NFR BLD AUTO: 5.3 %
NEUTROPHILS NFR BLD AUTO: 74.8 %
NRBC BLD AUTO-RTO: 0 /100 WBC
OSMOLALITY SERPL CALC.SUM OF ELEC: 274.9 MOSMOL/KG (ref 275–300)
PLATELET # BLD AUTO: 222 THOU/MM3 (ref 130–400)
PMV BLD AUTO: 9.8 FL (ref 9.4–12.4)
POTASSIUM SERPL-SCNC: 4.2 MEQ/L (ref 3.5–5.2)
PROT SERPL-MCNC: 7.5 G/DL (ref 6.1–8)
RBC # BLD AUTO: 5.25 MILL/MM3 (ref 4.2–5.4)
SEGMENTED NEUTROPHILS ABSOLUTE COUNT: 7.1 THOU/MM3 (ref 1.8–7.7)
SODIUM SERPL-SCNC: 138 MEQ/L (ref 135–145)
WBC # BLD AUTO: 9.5 THOU/MM3 (ref 4.8–10.8)

## 2024-03-23 PROCEDURE — 80053 COMPREHEN METABOLIC PANEL: CPT

## 2024-03-23 PROCEDURE — 36415 COLL VENOUS BLD VENIPUNCTURE: CPT

## 2024-03-23 PROCEDURE — 2580000003 HC RX 258

## 2024-03-23 PROCEDURE — 84702 CHORIONIC GONADOTROPIN TEST: CPT

## 2024-03-23 PROCEDURE — 6360000002 HC RX W HCPCS

## 2024-03-23 PROCEDURE — 96376 TX/PRO/DX INJ SAME DRUG ADON: CPT

## 2024-03-23 PROCEDURE — 81025 URINE PREGNANCY TEST: CPT

## 2024-03-23 PROCEDURE — 96374 THER/PROPH/DIAG INJ IV PUSH: CPT

## 2024-03-23 PROCEDURE — 99284 EMERGENCY DEPT VISIT MOD MDM: CPT

## 2024-03-23 PROCEDURE — 76817 TRANSVAGINAL US OBSTETRIC: CPT

## 2024-03-23 PROCEDURE — 81001 URINALYSIS AUTO W/SCOPE: CPT

## 2024-03-23 PROCEDURE — 85025 COMPLETE CBC W/AUTO DIFF WBC: CPT

## 2024-03-23 PROCEDURE — 84703 CHORIONIC GONADOTROPIN ASSAY: CPT

## 2024-03-23 RX ORDER — ONDANSETRON 2 MG/ML
4 INJECTION INTRAMUSCULAR; INTRAVENOUS ONCE
Status: COMPLETED | OUTPATIENT
Start: 2024-03-23 | End: 2024-03-23

## 2024-03-23 RX ORDER — 0.9 % SODIUM CHLORIDE 0.9 %
1000 INTRAVENOUS SOLUTION INTRAVENOUS ONCE
Status: COMPLETED | OUTPATIENT
Start: 2024-03-23 | End: 2024-03-23

## 2024-03-23 RX ORDER — ONDANSETRON 4 MG/1
4 TABLET, ORALLY DISINTEGRATING ORAL 3 TIMES DAILY PRN
Qty: 21 TABLET | Refills: 0 | Status: SHIPPED | OUTPATIENT
Start: 2024-03-23

## 2024-03-23 RX ADMIN — SODIUM CHLORIDE 1000 ML: 9 INJECTION, SOLUTION INTRAVENOUS at 18:38

## 2024-03-23 RX ADMIN — ONDANSETRON 4 MG: 2 INJECTION INTRAMUSCULAR; INTRAVENOUS at 18:37

## 2024-03-23 RX ADMIN — ONDANSETRON 4 MG: 2 INJECTION INTRAMUSCULAR; INTRAVENOUS at 20:35

## 2024-03-23 ASSESSMENT — PAIN DESCRIPTION - LOCATION: LOCATION: ABDOMEN

## 2024-03-23 ASSESSMENT — PAIN - FUNCTIONAL ASSESSMENT: PAIN_FUNCTIONAL_ASSESSMENT: 0-10

## 2024-03-23 ASSESSMENT — PAIN DESCRIPTION - DESCRIPTORS: DESCRIPTORS: CRAMPING

## 2024-03-23 ASSESSMENT — PAIN SCALES - GENERAL: PAINLEVEL_OUTOF10: 5

## 2024-03-23 NOTE — ED TRIAGE NOTES
Pt presents to ED with complaints of nausea, vomiting, and diarrhea. Pt states that her daughter was dx with norovirus a few days ago and she thinks that she may have caught it, the symptoms began this morning. Pt also notes that she thinks she is about 6 weeks pregnant.

## 2024-03-23 NOTE — ED NOTES
Pt medicated per MAR. Vital signs reassessed, pt in bed, eyes open, respirations even and unlabored. No needs voiced.

## 2024-03-23 NOTE — ED PROVIDER NOTES
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
## 2024-03-23 NOTE — ED PROVIDER NOTES
Transfer of Care Note:   Physician Signing out: Dr. Massey  Receiving Physician: Yobani Hendricks MD  Sign out time: 2000      Brief history:  28-year-old female G3, P2 presents to the ED complaining of nausea vomiting diarrhea.  Patient has not yet established care for this pregnancy.    Items pending that need to be checked:  Transvaginal ultrasound      Tentative Impression of patient:  28-year-old female presents for nausea, vomiting, diarrhea.  No formal ultrasound performed yet, obtaining formal ultrasound to ensure no ectopic pregnancy    Expected disposition of patient:  Pending results, discharged.        Additional Assessment and results:   I have personally performed a face to face diagnostic evaluation on this patient. The patient's initial evaluation and plan have been discussed with the prior physician who initially evaluated the patient. Nursing Notes, Past Medical Hx, Past Surgical Hx, Social Hx, Allergies, vital signs and Family Hx were all reviewed.      Vitals:    03/23/24 2038   BP: (!) 99/59   Pulse: 83   Resp:    Temp:    SpO2: 100%           Labs Reviewed   COMPREHENSIVE METABOLIC PANEL W/ REFLEX TO MG FOR LOW K - Abnormal; Notable for the following components:       Result Value    CO2 20 (*)     All other components within normal limits   HCG, QUANTITATIVE, PREGNANCY - Abnormal; Notable for the following components:    hCG,Beta Subunit,Qual,Serum 46432.0 (*)     All other components within normal limits   OSMOLALITY - Abnormal; Notable for the following components:    Osmolality Calc 274.9 (*)     All other components within normal limits   PREGNANCY, URINE   CBC WITH AUTO DIFFERENTIAL   HCG, SERUM, QUALITATIVE   ANION GAP   GLOMERULAR FILTRATION RATE, ESTIMATED   URINALYSIS WITH REFLEX TO CULTURE         Medications   ondansetron (ZOFRAN) injection 4 mg (4 mg IntraVENous Given 3/23/24 1837)   sodium chloride 0.9 % bolus 1,000 mL (1,000 mLs IntraVENous New Bag 3/23/24 1838)

## 2024-03-24 LAB
BACTERIA URNS QL MICRO: ABNORMAL /HPF
BILIRUB UR QL STRIP.AUTO: NEGATIVE
CASTS #/AREA URNS LPF: ABNORMAL /LPF
CASTS 2: ABNORMAL /LPF
CHARACTER UR: ABNORMAL
COLOR: YELLOW
CRYSTALS URNS MICRO: ABNORMAL
EPITHELIAL CELLS, UA: ABNORMAL /HPF
GLUCOSE UR QL STRIP.AUTO: NEGATIVE MG/DL
HGB UR QL STRIP.AUTO: NEGATIVE
KETONES UR QL STRIP.AUTO: 15
MISCELLANEOUS 2: ABNORMAL
NITRITE UR QL STRIP: NEGATIVE
PH UR STRIP.AUTO: 5.5 [PH] (ref 5–9)
PROT UR STRIP.AUTO-MCNC: NEGATIVE MG/DL
RBC URINE: ABNORMAL /HPF
RENAL EPI CELLS #/AREA URNS HPF: ABNORMAL /[HPF]
SP GR UR REFRACT.AUTO: 1.03 (ref 1–1.03)
UROBILINOGEN, URINE: 0.2 EU/DL (ref 0–1)
WBC #/AREA URNS HPF: ABNORMAL /HPF
WBC #/AREA URNS HPF: ABNORMAL /[HPF]
YEAST LIKE FUNGI URNS QL MICRO: ABNORMAL

## 2024-03-24 NOTE — DISCHARGE INSTRUCTIONS
Return to the ED mainly for any change or worsening symptoms including but not limited to chest pain, shortness of breath, dizziness, nausea or vomiting.

## 2025-05-02 NOTE — FLOWSHEET NOTE
Patient ambulated to bathroom with standby assist. Patient tolerated well. Brianna care completed. Patient voided x1. Dermoplast spray and witch hazel pads applied. Patient placed in wheelchair for transport to mom/baby. Call light within reach. Routing to [facility].